# Patient Record
Sex: FEMALE | Race: BLACK OR AFRICAN AMERICAN | NOT HISPANIC OR LATINO | Employment: FULL TIME | URBAN - METROPOLITAN AREA
[De-identification: names, ages, dates, MRNs, and addresses within clinical notes are randomized per-mention and may not be internally consistent; named-entity substitution may affect disease eponyms.]

---

## 2023-09-29 PROBLEM — A59.9 TRICHOMONIASIS: Status: ACTIVE | Noted: 2023-09-29

## 2023-09-29 PROBLEM — Z86.19 HISTORY OF HERPES GENITALIS: Status: ACTIVE | Noted: 2023-09-29

## 2023-09-29 PROBLEM — E66.813 OBESITY, CLASS III, BMI 40-49.9 (MORBID OBESITY): Status: ACTIVE | Noted: 2023-09-29

## 2023-09-29 PROBLEM — F32.A DEPRESSION: Status: ACTIVE | Noted: 2023-09-29

## 2023-09-29 PROBLEM — E66.01 OBESITY, CLASS III, BMI 40-49.9 (MORBID OBESITY) (MULTI): Status: ACTIVE | Noted: 2023-09-29

## 2023-09-29 PROBLEM — A74.9 CHLAMYDIA: Status: ACTIVE | Noted: 2023-09-29

## 2023-09-29 RX ORDER — DOXYCYCLINE HYCLATE 100 MG
100 TABLET ORAL 2 TIMES DAILY
COMMUNITY
Start: 2022-08-08 | End: 2023-10-26 | Stop reason: ALTCHOICE

## 2023-09-29 RX ORDER — ARTIFICIAL TEARS 1; 2; 3 MG/ML; MG/ML; MG/ML
1 SOLUTION/ DROPS OPHTHALMIC 4 TIMES DAILY PRN
COMMUNITY
Start: 2022-04-04 | End: 2023-10-26 | Stop reason: ALTCHOICE

## 2023-09-29 RX ORDER — VALACYCLOVIR HYDROCHLORIDE 1 G/1
1 TABLET, FILM COATED ORAL DAILY
COMMUNITY
Start: 2018-04-12 | End: 2023-10-05 | Stop reason: SDUPTHER

## 2023-09-29 RX ORDER — IBUPROFEN 600 MG/1
600 TABLET ORAL EVERY 8 HOURS PRN
COMMUNITY
Start: 2018-07-03 | End: 2023-10-26 | Stop reason: SDUPTHER

## 2023-10-05 ENCOUNTER — TELEPHONE (OUTPATIENT)
Dept: OBSTETRICS AND GYNECOLOGY | Facility: HOSPITAL | Age: 33
End: 2023-10-05
Payer: COMMERCIAL

## 2023-10-05 DIAGNOSIS — B00.9 HSV (HERPES SIMPLEX VIRUS) INFECTION: Primary | ICD-10-CM

## 2023-10-05 RX ORDER — VALACYCLOVIR HYDROCHLORIDE 1 G/1
1000 TABLET, FILM COATED ORAL DAILY
Qty: 90 TABLET | Refills: 4 | Status: SHIPPED | OUTPATIENT
Start: 2023-10-05 | End: 2023-10-26 | Stop reason: SDUPTHER

## 2023-10-05 NOTE — TELEPHONE ENCOUNTER
Requested Prescriptions     Pending Prescriptions Disp Refills    valACYclovir (Valtrex) 1 gram tablet       Sig: Take 1 tablet (1,000 mg) by mouth once daily.     Patient called in regarding medication refill.  [facility]  Patient stated she has an appt schedule for the end of the month at [facility] but need the medication prior to visit.   Sent the request to the provider for approval.

## 2023-10-26 ENCOUNTER — LAB (OUTPATIENT)
Dept: LAB | Facility: LAB | Age: 33
End: 2023-10-26
Payer: COMMERCIAL

## 2023-10-26 ENCOUNTER — OFFICE VISIT (OUTPATIENT)
Dept: OBSTETRICS AND GYNECOLOGY | Facility: CLINIC | Age: 33
End: 2023-10-26
Payer: COMMERCIAL

## 2023-10-26 VITALS
SYSTOLIC BLOOD PRESSURE: 136 MMHG | WEIGHT: 242.9 LBS | HEART RATE: 88 BPM | DIASTOLIC BLOOD PRESSURE: 83 MMHG | BODY MASS INDEX: 43.03 KG/M2

## 2023-10-26 DIAGNOSIS — Z01.419 VISIT FOR GYNECOLOGIC EXAMINATION: ICD-10-CM

## 2023-10-26 DIAGNOSIS — M54.6 CHRONIC MIDLINE THORACIC BACK PAIN: ICD-10-CM

## 2023-10-26 DIAGNOSIS — Z12.4 CERVICAL CANCER SCREENING: Primary | ICD-10-CM

## 2023-10-26 DIAGNOSIS — Z11.3 SCREEN FOR SEXUALLY TRANSMITTED DISEASES: ICD-10-CM

## 2023-10-26 DIAGNOSIS — B00.9 HSV (HERPES SIMPLEX VIRUS) INFECTION: ICD-10-CM

## 2023-10-26 DIAGNOSIS — Z86.19 HISTORY OF HERPES GENITALIS: ICD-10-CM

## 2023-10-26 DIAGNOSIS — G89.29 CHRONIC MIDLINE THORACIC BACK PAIN: ICD-10-CM

## 2023-10-26 LAB
ERYTHROCYTE [DISTWIDTH] IN BLOOD BY AUTOMATED COUNT: 13.9 % (ref 11.5–14.5)
EST. AVERAGE GLUCOSE BLD GHB EST-MCNC: 105 MG/DL
HBA1C MFR BLD: 5.3 %
HBV SURFACE AG SERPL QL IA: NONREACTIVE
HCT VFR BLD AUTO: 38.3 % (ref 36–46)
HCV AB SER QL: NONREACTIVE
HGB BLD-MCNC: 11.8 G/DL (ref 12–16)
HIV 1+2 AB+HIV1 P24 AG SERPL QL IA: NONREACTIVE
MCH RBC QN AUTO: 25.3 PG (ref 26–34)
MCHC RBC AUTO-ENTMCNC: 30.8 G/DL (ref 32–36)
MCV RBC AUTO: 82 FL (ref 80–100)
NRBC BLD-RTO: 0 /100 WBCS (ref 0–0)
PLATELET # BLD AUTO: 357 X10*3/UL (ref 150–450)
PMV BLD AUTO: 9.4 FL (ref 7.5–11.5)
RBC # BLD AUTO: 4.66 X10*6/UL (ref 4–5.2)
T PALLIDUM AB SER QL: NONREACTIVE
T4 FREE SERPL-MCNC: 0.93 NG/DL (ref 0.78–1.48)
TSH SERPL-ACNC: 0.1 MIU/L (ref 0.44–3.98)
WBC # BLD AUTO: 6.6 X10*3/UL (ref 4.4–11.3)

## 2023-10-26 PROCEDURE — 85027 COMPLETE CBC AUTOMATED: CPT

## 2023-10-26 PROCEDURE — 36415 COLL VENOUS BLD VENIPUNCTURE: CPT

## 2023-10-26 PROCEDURE — 99395 PREV VISIT EST AGE 18-39: CPT | Performed by: ADVANCED PRACTICE MIDWIFE

## 2023-10-26 PROCEDURE — 84443 ASSAY THYROID STIM HORMONE: CPT

## 2023-10-26 PROCEDURE — 87800 DETECT AGNT MULT DNA DIREC: CPT | Performed by: ADVANCED PRACTICE MIDWIFE

## 2023-10-26 PROCEDURE — 87389 HIV-1 AG W/HIV-1&-2 AB AG IA: CPT

## 2023-10-26 PROCEDURE — 88175 CYTOPATH C/V AUTO FLUID REDO: CPT | Mod: TC | Performed by: ADVANCED PRACTICE MIDWIFE

## 2023-10-26 PROCEDURE — 86803 HEPATITIS C AB TEST: CPT

## 2023-10-26 PROCEDURE — 87661 TRICHOMONAS VAGINALIS AMPLIF: CPT | Mod: 59 | Performed by: ADVANCED PRACTICE MIDWIFE

## 2023-10-26 PROCEDURE — 83036 HEMOGLOBIN GLYCOSYLATED A1C: CPT

## 2023-10-26 PROCEDURE — 87624 HPV HI-RISK TYP POOLED RSLT: CPT | Performed by: ADVANCED PRACTICE MIDWIFE

## 2023-10-26 PROCEDURE — 86780 TREPONEMA PALLIDUM: CPT

## 2023-10-26 PROCEDURE — 87340 HEPATITIS B SURFACE AG IA: CPT

## 2023-10-26 PROCEDURE — 84439 ASSAY OF FREE THYROXINE: CPT

## 2023-10-26 RX ORDER — CETIRIZINE HYDROCHLORIDE, PSEUDOEPHEDRINE HYDROCHLORIDE 5; 120 MG/1; MG/1
1 TABLET, FILM COATED, EXTENDED RELEASE ORAL 2 TIMES DAILY
COMMUNITY
Start: 2019-03-13

## 2023-10-26 RX ORDER — BACITRACIN 500 [USP'U]/G
1 OINTMENT TOPICAL 2 TIMES DAILY
COMMUNITY
Start: 2018-09-11

## 2023-10-26 RX ORDER — VALACYCLOVIR HYDROCHLORIDE 1 G/1
1000 TABLET, FILM COATED ORAL DAILY
Qty: 90 TABLET | Refills: 4 | Status: SHIPPED | OUTPATIENT
Start: 2023-10-26

## 2023-10-26 RX ORDER — IBUPROFEN 600 MG/1
600 TABLET ORAL EVERY 8 HOURS PRN
Qty: 90 TABLET | Refills: 2 | Status: SHIPPED | OUTPATIENT
Start: 2023-10-26

## 2023-10-26 ASSESSMENT — ENCOUNTER SYMPTOMS
GASTROINTESTINAL NEGATIVE: 1
CARDIOVASCULAR NEGATIVE: 1
RESPIRATORY NEGATIVE: 1
NEUROLOGICAL NEGATIVE: 1
CONSTITUTIONAL NEGATIVE: 1
PSYCHIATRIC NEGATIVE: 1
EYES NEGATIVE: 1
HEMATOLOGIC/LYMPHATIC NEGATIVE: 1
MUSCULOSKELETAL NEGATIVE: 1
ENDOCRINE NEGATIVE: 1
ALLERGIC/IMMUNOLOGIC NEGATIVE: 1

## 2023-10-26 ASSESSMENT — PAIN SCALES - GENERAL: PAINLEVEL: 0-NO PAIN

## 2023-10-26 NOTE — PROGRESS NOTES
Subjective   Niya Au is a 33 y.o. female who is here for a routine exam. Periods are regular every 28-30 days, lasting 5 days. Dysmenorrhea:moderate, occurring premenstrually. Cyclic symptoms include none. No intermenstrual bleeding, spotting, or discharge.    Current contraception: condoms  History of abnormal Pap smear: no  Family history of uterine or ovarian cancer: no  Regular self breast exam: yes  History of abnormal mammogram: no  Family history of breast cancer: no  History of abnormal lipids: no  Menstrual History:  OB History          5    Para   2    Term   2       0    AB   3    Living   1         SAB   0    IAB   3    Ectopic   0    Multiple        Live Births   1                Patient's last menstrual period was 10/20/2023 (exact date).         Review of Systems   Constitutional: Negative.    HENT: Negative.     Eyes: Negative.    Respiratory: Negative.     Cardiovascular: Negative.    Gastrointestinal: Negative.    Endocrine: Negative.    Genitourinary: Negative.    Musculoskeletal: Negative.    Skin: Negative.    Allergic/Immunologic: Negative.    Neurological: Negative.    Hematological: Negative.    Psychiatric/Behavioral: Negative.         Objective   /83   Pulse 88   Wt 110 kg (242 lb 14.4 oz)   LMP 10/20/2023 (Exact Date)   BMI 43.03 kg/m²     Physical Exam  Vitals reviewed.   Constitutional:       General: She is not in acute distress.     Appearance: Normal appearance.   HENT:      Head: Normocephalic.   Eyes:      Pupils: Pupils are equal, round, and reactive to light.   Cardiovascular:      Rate and Rhythm: Normal rate and regular rhythm.      Heart sounds: No murmur heard.     No gallop.   Pulmonary:      Effort: Pulmonary effort is normal. No respiratory distress.      Breath sounds: Normal breath sounds. No stridor. No wheezing, rhonchi or rales.   Chest:      Chest wall: No tenderness or crepitus.   Breasts:     Right: No inverted nipple, mass, nipple  discharge, skin change or tenderness.      Left: Normal. No inverted nipple, mass, nipple discharge, skin change or tenderness.      Comments: Bilateral scar reduction surgery  Bilateral nipple rings  Abdominal:      General: Abdomen is flat.      Palpations: Abdomen is soft. There is no mass.      Tenderness: There is no abdominal tenderness. There is no right CVA tenderness, left CVA tenderness or rebound.      Hernia: No hernia is present.   Genitourinary:     General: Normal vulva.      Pubic Area: No rash.       Labia:         Right: No rash, tenderness, lesion or injury.         Left: No rash, tenderness, lesion or injury.       Urethra: No prolapse or urethral swelling.      Vagina: Normal. No foreign body. No erythema, tenderness, bleeding, lesions or prolapsed vaginal walls.      Cervix: No cervical motion tenderness, friability or lesion.      Uterus: Normal. Not enlarged, not tender and no uterine prolapse.       Adnexa: Right adnexa normal and left adnexa normal.        Right: No mass or tenderness.          Left: No mass or tenderness.        Rectum: Normal.      Comments: EGBUS WNL   Musculoskeletal:      Cervical back: Neck supple. No rigidity or tenderness.   Skin:     General: Skin is warm and dry.   Neurological:      Mental Status: She is alert.   Psychiatric:         Mood and Affect: Mood normal.         Behavior: Behavior normal.         Thought Content: Thought content normal.         Judgment: Judgment normal.        Assessment/Plan   Problem List Items Addressed This Visit       History of herpes genitalis    Relevant Medications    valACYclovir (Valtrex) 1 gram tablet     Other Visit Diagnoses       Cervical cancer screening    -  Primary    Relevant Orders    THINPREP PAP TEST    Screen for sexually transmitted diseases        Relevant Orders    Hepatitis C Antibody    HIV-1 and HIV-2 antibodies    Syphilis Screen with Reflex    Hepatitis B surface Ag    Visit for gynecologic examination         Relevant Orders    CBC    THINPREP PAP TEST    Hemoglobin A1c    Hepatitis C Antibody    HIV-1 and HIV-2 antibodies    Syphilis Screen with Reflex    TSH with reflex to Free T4 if abnormal    HSV (herpes simplex virus) infection        Relevant Medications    valACYclovir (Valtrex) 1 gram tablet    Chronic midline thoracic back pain        Relevant Medications    ibuprofen (IBU) 600 mg tablet    Other Relevant Orders    Referral to [facility]             All questions answered..  Francesca BETTSM

## 2023-10-28 LAB
C TRACH RRNA SPEC QL NAA+PROBE: NEGATIVE
N GONORRHOEA DNA SPEC QL PROBE+SIG AMP: NEGATIVE
T VAGINALIS RRNA SPEC QL NAA+PROBE: NEGATIVE

## 2023-11-10 LAB
CYTOLOGY CMNT CVX/VAG CYTO-IMP: NORMAL
HPV HR GENOTYPES PNL CVX NAA+PROBE: NEGATIVE
HPV HR GENOTYPES PNL CVX NAA+PROBE: NEGATIVE
HPV16 DNA SPEC QL NAA+PROBE: NEGATIVE
HPV18 DNA SPEC QL NAA+PROBE: NEGATIVE
LAB AP HPV GENOTYPE QUESTION: YES
LAB AP HPV HR: NORMAL
LAB AP PAP ADDITIONAL TESTS: NORMAL
LABORATORY COMMENT REPORT: NORMAL
LMP START DATE: NORMAL
PATH REPORT.TOTAL CANCER: NORMAL

## 2024-11-24 DIAGNOSIS — Z86.19 HISTORY OF HERPES GENITALIS: ICD-10-CM

## 2024-11-24 DIAGNOSIS — B00.9 HSV (HERPES SIMPLEX VIRUS) INFECTION: ICD-10-CM

## 2024-11-25 RX ORDER — VALACYCLOVIR HYDROCHLORIDE 1 G/1
1000 TABLET, FILM COATED ORAL DAILY
Qty: 90 TABLET | Refills: 3 | Status: SHIPPED | OUTPATIENT
Start: 2024-11-25

## 2024-12-09 DIAGNOSIS — M54.6 CHRONIC MIDLINE THORACIC BACK PAIN: ICD-10-CM

## 2024-12-09 DIAGNOSIS — G89.29 CHRONIC MIDLINE THORACIC BACK PAIN: ICD-10-CM

## 2024-12-10 RX ORDER — IBUPROFEN 600 MG/1
600 TABLET ORAL EVERY 8 HOURS PRN
Qty: 90 TABLET | Refills: 1 | Status: SHIPPED | OUTPATIENT
Start: 2024-12-10

## 2025-02-09 ENCOUNTER — CLINICAL SUPPORT (OUTPATIENT)
Dept: EMERGENCY MEDICINE | Facility: HOSPITAL | Age: 35
DRG: 558 | End: 2025-02-09
Payer: COMMERCIAL

## 2025-02-09 ENCOUNTER — HOSPITAL ENCOUNTER (INPATIENT)
Facility: HOSPITAL | Age: 35
LOS: 5 days | Discharge: HOME | DRG: 558 | End: 2025-02-14
Attending: EMERGENCY MEDICINE | Admitting: STUDENT IN AN ORGANIZED HEALTH CARE EDUCATION/TRAINING PROGRAM
Payer: COMMERCIAL

## 2025-02-09 DIAGNOSIS — M62.82 NON-TRAUMATIC RHABDOMYOLYSIS: Primary | ICD-10-CM

## 2025-02-09 DIAGNOSIS — E55.9 VITAMIN D DEFICIENCY: ICD-10-CM

## 2025-02-09 LAB
25(OH)D3 SERPL-MCNC: 8 NG/ML (ref 30–100)
ALBUMIN SERPL BCP-MCNC: 3.3 G/DL (ref 3.4–5)
ALBUMIN SERPL BCP-MCNC: 4.2 G/DL (ref 3.4–5)
ALP SERPL-CCNC: 63 U/L (ref 33–110)
ALT SERPL W P-5'-P-CCNC: 220 U/L (ref 7–45)
AMPHETAMINES UR QL SCN: ABNORMAL
ANION GAP SERPL CALC-SCNC: 16 MMOL/L (ref 10–20)
ANION GAP SERPL CALC-SCNC: 9 MMOL/L (ref 10–20)
APPEARANCE UR: CLEAR
APTT PPP: 28 SECONDS (ref 27–38)
AST SERPL W P-5'-P-CCNC: 552 U/L (ref 9–39)
ATRIAL RATE: 128 BPM
BARBITURATES UR QL SCN: ABNORMAL
BASOPHILS # BLD AUTO: 0.03 X10*3/UL (ref 0–0.1)
BASOPHILS NFR BLD AUTO: 0.3 %
BENZODIAZ UR QL SCN: ABNORMAL
BILIRUB SERPL-MCNC: 0.4 MG/DL (ref 0–1.2)
BILIRUB UR STRIP.AUTO-MCNC: NEGATIVE MG/DL
BUN SERPL-MCNC: 14 MG/DL (ref 6–23)
BUN SERPL-MCNC: 9 MG/DL (ref 6–23)
BZE UR QL SCN: ABNORMAL
CALCIUM SERPL-MCNC: 8.4 MG/DL (ref 8.6–10.6)
CALCIUM SERPL-MCNC: 9.7 MG/DL (ref 8.6–10.6)
CANNABINOIDS UR QL SCN: ABNORMAL
CHLORIDE SERPL-SCNC: 101 MMOL/L (ref 98–107)
CHLORIDE SERPL-SCNC: 98 MMOL/L (ref 98–107)
CK SERPL-CCNC: ABNORMAL U/L (ref 0–215)
CK SERPL-CCNC: NORMAL U/L
CO2 SERPL-SCNC: 26 MMOL/L (ref 21–32)
CO2 SERPL-SCNC: 29 MMOL/L (ref 21–32)
COLOR UR: ABNORMAL
CREAT SERPL-MCNC: 0.43 MG/DL (ref 0.5–1.05)
CREAT SERPL-MCNC: 0.49 MG/DL (ref 0.5–1.05)
D DIMER PPP FEU-MCNC: 895 NG/ML FEU
EGFRCR SERPLBLD CKD-EPI 2021: >90 ML/MIN/1.73M*2
EGFRCR SERPLBLD CKD-EPI 2021: >90 ML/MIN/1.73M*2
EOSINOPHIL # BLD AUTO: 0.06 X10*3/UL (ref 0–0.7)
EOSINOPHIL NFR BLD AUTO: 0.6 %
ERYTHROCYTE [DISTWIDTH] IN BLOOD BY AUTOMATED COUNT: 12.8 % (ref 11.5–14.5)
ETHANOL SERPL-MCNC: <10 MG/DL
ETHANOL SERPL-MCNC: <10 MG/DL
FENTANYL+NORFENTANYL UR QL SCN: ABNORMAL
FIBRINOGEN PPP-MCNC: 452 MG/DL (ref 200–400)
FLUAV RNA RESP QL NAA+PROBE: NOT DETECTED
FLUBV RNA RESP QL NAA+PROBE: NOT DETECTED
GLUCOSE SERPL-MCNC: 129 MG/DL (ref 74–99)
GLUCOSE SERPL-MCNC: 82 MG/DL (ref 74–99)
GLUCOSE UR STRIP.AUTO-MCNC: NORMAL MG/DL
HAV IGM SER QL: NONREACTIVE
HBV CORE IGM SER QL: NONREACTIVE
HBV SURFACE AG SERPL QL IA: NONREACTIVE
HCT VFR BLD AUTO: 39.4 % (ref 36–46)
HCV AB SER QL: NONREACTIVE
HGB BLD-MCNC: 12.8 G/DL (ref 12–16)
HOLD SPECIMEN: NORMAL
IMM GRANULOCYTES # BLD AUTO: 0.12 X10*3/UL (ref 0–0.7)
IMM GRANULOCYTES NFR BLD AUTO: 1.2 % (ref 0–0.9)
INR PPP: 1 (ref 0.9–1.1)
KETONES UR STRIP.AUTO-MCNC: ABNORMAL MG/DL
LEUKOCYTE ESTERASE UR QL STRIP.AUTO: NEGATIVE
LYMPHOCYTES # BLD AUTO: 1.91 X10*3/UL (ref 1.2–4.8)
LYMPHOCYTES NFR BLD AUTO: 18.6 %
MAGNESIUM SERPL-MCNC: 1.94 MG/DL (ref 1.6–2.4)
MAGNESIUM SERPL-MCNC: 2.08 MG/DL (ref 1.6–2.4)
MCH RBC QN AUTO: 26.4 PG (ref 26–34)
MCHC RBC AUTO-ENTMCNC: 32.5 G/DL (ref 32–36)
MCV RBC AUTO: 81 FL (ref 80–100)
METHADONE UR QL SCN: ABNORMAL
MONOCYTES # BLD AUTO: 0.36 X10*3/UL (ref 0.1–1)
MONOCYTES NFR BLD AUTO: 3.5 %
MUCOUS THREADS #/AREA URNS AUTO: NORMAL /LPF
NEUTROPHILS # BLD AUTO: 7.8 X10*3/UL (ref 1.2–7.7)
NEUTROPHILS NFR BLD AUTO: 75.8 %
NITRITE UR QL STRIP.AUTO: NEGATIVE
NRBC BLD-RTO: 0 /100 WBCS (ref 0–0)
OPIATES UR QL SCN: ABNORMAL
OXYCODONE+OXYMORPHONE UR QL SCN: ABNORMAL
P AXIS: 70 DEGREES
P OFFSET: 218 MS
P ONSET: 135 MS
PCP UR QL SCN: ABNORMAL
PH UR STRIP.AUTO: 6 [PH]
PHOSPHATE SERPL-MCNC: 3.7 MG/DL (ref 2.5–4.9)
PHOSPHATE SERPL-MCNC: 4 MG/DL (ref 2.5–4.9)
PLATELET # BLD AUTO: 415 X10*3/UL (ref 150–450)
POTASSIUM SERPL-SCNC: 4.1 MMOL/L (ref 3.5–5.3)
POTASSIUM SERPL-SCNC: 4.1 MMOL/L (ref 3.5–5.3)
PR INTERVAL: 174 MS
PREGNANCY TEST URINE, POC: NEGATIVE
PROT SERPL-MCNC: 8.1 G/DL (ref 6.4–8.2)
PROT UR STRIP.AUTO-MCNC: ABNORMAL MG/DL
PROTHROMBIN TIME: 11.7 SECONDS (ref 9.8–12.8)
Q ONSET: 222 MS
QRS COUNT: 21 BEATS
QRS DURATION: 68 MS
QT INTERVAL: 296 MS
QTC CALCULATION(BAZETT): 432 MS
QTC FREDERICIA: 381 MS
R AXIS: 97 DEGREES
RBC # BLD AUTO: 4.84 X10*6/UL (ref 4–5.2)
RBC # UR STRIP.AUTO: ABNORMAL MG/DL
RBC #/AREA URNS AUTO: NORMAL /HPF
SARS-COV-2 RNA RESP QL NAA+PROBE: NOT DETECTED
SODIUM SERPL-SCNC: 135 MMOL/L (ref 136–145)
SODIUM SERPL-SCNC: 136 MMOL/L (ref 136–145)
SP GR UR STRIP.AUTO: 1.03
SQUAMOUS #/AREA URNS AUTO: NORMAL /HPF
T AXIS: -2 DEGREES
T OFFSET: 370 MS
UROBILINOGEN UR STRIP.AUTO-MCNC: NORMAL MG/DL
VENTRICULAR RATE: 128 BPM
WBC # BLD AUTO: 10.3 X10*3/UL (ref 4.4–11.3)
WBC #/AREA URNS AUTO: NORMAL /HPF

## 2025-02-09 PROCEDURE — 96374 THER/PROPH/DIAG INJ IV PUSH: CPT

## 2025-02-09 PROCEDURE — 82306 VITAMIN D 25 HYDROXY: CPT

## 2025-02-09 PROCEDURE — 99285 EMERGENCY DEPT VISIT HI MDM: CPT | Mod: 25 | Performed by: EMERGENCY MEDICINE

## 2025-02-09 PROCEDURE — 80053 COMPREHEN METABOLIC PANEL: CPT

## 2025-02-09 PROCEDURE — 85384 FIBRINOGEN ACTIVITY: CPT

## 2025-02-09 PROCEDURE — 86038 ANTINUCLEAR ANTIBODIES: CPT | Performed by: INTERNAL MEDICINE

## 2025-02-09 PROCEDURE — 80307 DRUG TEST PRSMV CHEM ANLYZR: CPT

## 2025-02-09 PROCEDURE — 81003 URINALYSIS AUTO W/O SCOPE: CPT

## 2025-02-09 PROCEDURE — 84100 ASSAY OF PHOSPHORUS: CPT

## 2025-02-09 PROCEDURE — 83735 ASSAY OF MAGNESIUM: CPT

## 2025-02-09 PROCEDURE — 85025 COMPLETE CBC W/AUTO DIFF WBC: CPT

## 2025-02-09 PROCEDURE — 2500000004 HC RX 250 GENERAL PHARMACY W/ HCPCS (ALT 636 FOR OP/ED)

## 2025-02-09 PROCEDURE — 82550 ASSAY OF CK (CPK): CPT

## 2025-02-09 PROCEDURE — 84182 PROTEIN WESTERN BLOT TEST: CPT

## 2025-02-09 PROCEDURE — 87636 SARSCOV2 & INF A&B AMP PRB: CPT | Performed by: EMERGENCY MEDICINE

## 2025-02-09 PROCEDURE — 36415 COLL VENOUS BLD VENIPUNCTURE: CPT

## 2025-02-09 PROCEDURE — 99285 EMERGENCY DEPT VISIT HI MDM: CPT | Performed by: EMERGENCY MEDICINE

## 2025-02-09 PROCEDURE — 96361 HYDRATE IV INFUSION ADD-ON: CPT

## 2025-02-09 PROCEDURE — 2500000001 HC RX 250 WO HCPCS SELF ADMINISTERED DRUGS (ALT 637 FOR MEDICARE OP)

## 2025-02-09 PROCEDURE — 86705 HEP B CORE ANTIBODY IGM: CPT

## 2025-02-09 PROCEDURE — 99222 1ST HOSP IP/OBS MODERATE 55: CPT

## 2025-02-09 PROCEDURE — 85379 FIBRIN DEGRADATION QUANT: CPT

## 2025-02-09 PROCEDURE — 86235 NUCLEAR ANTIGEN ANTIBODY: CPT | Performed by: INTERNAL MEDICINE

## 2025-02-09 PROCEDURE — 93005 ELECTROCARDIOGRAM TRACING: CPT

## 2025-02-09 PROCEDURE — 81025 URINE PREGNANCY TEST: CPT | Performed by: EMERGENCY MEDICINE

## 2025-02-09 PROCEDURE — 85610 PROTHROMBIN TIME: CPT

## 2025-02-09 PROCEDURE — 82077 ASSAY SPEC XCP UR&BREATH IA: CPT

## 2025-02-09 PROCEDURE — 93010 ELECTROCARDIOGRAM REPORT: CPT | Performed by: EMERGENCY MEDICINE

## 2025-02-09 PROCEDURE — 1200000002 HC GENERAL ROOM WITH TELEMETRY DAILY

## 2025-02-09 RX ORDER — POLYETHYLENE GLYCOL 3350 17 G/17G
17 POWDER, FOR SOLUTION ORAL DAILY
Status: DISCONTINUED | OUTPATIENT
Start: 2025-02-09 | End: 2025-02-10

## 2025-02-09 RX ORDER — ENOXAPARIN SODIUM 100 MG/ML
40 INJECTION SUBCUTANEOUS EVERY 24 HOURS
Status: DISCONTINUED | OUTPATIENT
Start: 2025-02-09 | End: 2025-02-14 | Stop reason: HOSPADM

## 2025-02-09 RX ORDER — ACETAMINOPHEN 325 MG/1
975 TABLET ORAL 3 TIMES DAILY
Status: DISCONTINUED | OUTPATIENT
Start: 2025-02-09 | End: 2025-02-14 | Stop reason: HOSPADM

## 2025-02-09 RX ORDER — OXYCODONE HYDROCHLORIDE 5 MG/1
5 TABLET ORAL EVERY 4 HOURS PRN
Status: DISCONTINUED | OUTPATIENT
Start: 2025-02-09 | End: 2025-02-13

## 2025-02-09 RX ORDER — OXYCODONE HYDROCHLORIDE 5 MG/1
10 TABLET ORAL EVERY 4 HOURS PRN
Status: DISCONTINUED | OUTPATIENT
Start: 2025-02-09 | End: 2025-02-13

## 2025-02-09 RX ORDER — MORPHINE SULFATE 4 MG/ML
2 INJECTION INTRAVENOUS EVERY 4 HOURS PRN
Status: DISCONTINUED | OUTPATIENT
Start: 2025-02-09 | End: 2025-02-09

## 2025-02-09 RX ORDER — SODIUM CHLORIDE 9 MG/ML
200 INJECTION, SOLUTION INTRAVENOUS CONTINUOUS
Status: ACTIVE | OUTPATIENT
Start: 2025-02-09 | End: 2025-02-10

## 2025-02-09 RX ORDER — SODIUM CHLORIDE, SODIUM LACTATE, POTASSIUM CHLORIDE, CALCIUM CHLORIDE 600; 310; 30; 20 MG/100ML; MG/100ML; MG/100ML; MG/100ML
125 INJECTION, SOLUTION INTRAVENOUS CONTINUOUS
Status: DISCONTINUED | OUTPATIENT
Start: 2025-02-09 | End: 2025-02-09

## 2025-02-09 RX ADMIN — HYDROMORPHONE HYDROCHLORIDE 0.5 MG: 0.5 INJECTION, SOLUTION INTRAMUSCULAR; INTRAVENOUS; SUBCUTANEOUS at 08:45

## 2025-02-09 RX ADMIN — ENOXAPARIN SODIUM 40 MG: 100 INJECTION SUBCUTANEOUS at 21:40

## 2025-02-09 RX ADMIN — OXYCODONE 10 MG: 5 TABLET ORAL at 14:43

## 2025-02-09 RX ADMIN — OXYCODONE 10 MG: 5 TABLET ORAL at 19:05

## 2025-02-09 RX ADMIN — ACETAMINOPHEN 975 MG: 325 TABLET ORAL at 10:54

## 2025-02-09 RX ADMIN — SODIUM CHLORIDE, POTASSIUM CHLORIDE, SODIUM LACTATE AND CALCIUM CHLORIDE 125 ML/HR: 600; 310; 30; 20 INJECTION, SOLUTION INTRAVENOUS at 07:43

## 2025-02-09 RX ADMIN — SODIUM CHLORIDE 200 ML/HR: 9 INJECTION, SOLUTION INTRAVENOUS at 22:23

## 2025-02-09 RX ADMIN — SODIUM CHLORIDE 200 ML/HR: 9 INJECTION, SOLUTION INTRAVENOUS at 10:50

## 2025-02-09 RX ADMIN — HYDROMORPHONE HYDROCHLORIDE 0.2 MG: 1 INJECTION, SOLUTION INTRAMUSCULAR; INTRAVENOUS; SUBCUTANEOUS at 21:40

## 2025-02-09 RX ADMIN — SODIUM CHLORIDE, POTASSIUM CHLORIDE, SODIUM LACTATE AND CALCIUM CHLORIDE 1000 ML: 600; 310; 30; 20 INJECTION, SOLUTION INTRAVENOUS at 05:30

## 2025-02-09 RX ADMIN — ACETAMINOPHEN 975 MG: 325 TABLET ORAL at 14:38

## 2025-02-09 RX ADMIN — ACETAMINOPHEN 975 MG: 325 TABLET ORAL at 21:39

## 2025-02-09 RX ADMIN — SODIUM CHLORIDE, POTASSIUM CHLORIDE, SODIUM LACTATE AND CALCIUM CHLORIDE 1000 ML: 600; 310; 30; 20 INJECTION, SOLUTION INTRAVENOUS at 08:39

## 2025-02-09 RX ADMIN — MORPHINE SULFATE 2 MG: 4 INJECTION INTRAVENOUS at 05:37

## 2025-02-09 SDOH — SOCIAL STABILITY: SOCIAL INSECURITY
WITHIN THE LAST YEAR, HAVE YOU BEEN RAPED OR FORCED TO HAVE ANY KIND OF SEXUAL ACTIVITY BY YOUR PARTNER OR EX-PARTNER?: NO

## 2025-02-09 SDOH — SOCIAL STABILITY: SOCIAL INSECURITY
WITHIN THE LAST YEAR, HAVE YOU BEEN KICKED, HIT, SLAPPED, OR OTHERWISE PHYSICALLY HURT BY YOUR PARTNER OR EX-PARTNER?: NO

## 2025-02-09 SDOH — SOCIAL STABILITY: SOCIAL INSECURITY: DO YOU FEEL UNSAFE GOING BACK TO THE PLACE WHERE YOU ARE LIVING?: NO

## 2025-02-09 SDOH — ECONOMIC STABILITY: FOOD INSECURITY: WITHIN THE PAST 12 MONTHS, YOU WORRIED THAT YOUR FOOD WOULD RUN OUT BEFORE YOU GOT THE MONEY TO BUY MORE.: NEVER TRUE

## 2025-02-09 SDOH — HEALTH STABILITY: PHYSICAL HEALTH: ON AVERAGE, HOW MANY MINUTES DO YOU ENGAGE IN EXERCISE AT THIS LEVEL?: 20 MIN

## 2025-02-09 SDOH — HEALTH STABILITY: PHYSICAL HEALTH
HOW OFTEN DO YOU NEED TO HAVE SOMEONE HELP YOU WHEN YOU READ INSTRUCTIONS, PAMPHLETS, OR OTHER WRITTEN MATERIAL FROM YOUR DOCTOR OR PHARMACY?: NEVER

## 2025-02-09 SDOH — ECONOMIC STABILITY: FOOD INSECURITY: WITHIN THE PAST 12 MONTHS, THE FOOD YOU BOUGHT JUST DIDN'T LAST AND YOU DIDN'T HAVE MONEY TO GET MORE.: NEVER TRUE

## 2025-02-09 SDOH — SOCIAL STABILITY: SOCIAL INSECURITY: WITHIN THE LAST YEAR, HAVE YOU BEEN HUMILIATED OR EMOTIONALLY ABUSED IN OTHER WAYS BY YOUR PARTNER OR EX-PARTNER?: NO

## 2025-02-09 SDOH — SOCIAL STABILITY: SOCIAL INSECURITY: DO YOU FEEL ANYONE HAS EXPLOITED OR TAKEN ADVANTAGE OF YOU FINANCIALLY OR OF YOUR PERSONAL PROPERTY?: NO

## 2025-02-09 SDOH — ECONOMIC STABILITY: TRANSPORTATION INSECURITY: IN THE PAST 12 MONTHS, HAS LACK OF TRANSPORTATION KEPT YOU FROM MEDICAL APPOINTMENTS OR FROM GETTING MEDICATIONS?: NO

## 2025-02-09 SDOH — SOCIAL STABILITY: SOCIAL INSECURITY: DOES ANYONE TRY TO KEEP YOU FROM HAVING/CONTACTING OTHER FRIENDS OR DOING THINGS OUTSIDE YOUR HOME?: NO

## 2025-02-09 SDOH — ECONOMIC STABILITY: INCOME INSECURITY: IN THE PAST 12 MONTHS HAS THE ELECTRIC, GAS, OIL, OR WATER COMPANY THREATENED TO SHUT OFF SERVICES IN YOUR HOME?: NO

## 2025-02-09 SDOH — HEALTH STABILITY: PHYSICAL HEALTH: ON AVERAGE, HOW MANY DAYS PER WEEK DO YOU ENGAGE IN MODERATE TO STRENUOUS EXERCISE (LIKE A BRISK WALK)?: 3 DAYS

## 2025-02-09 SDOH — SOCIAL STABILITY: SOCIAL INSECURITY: WITHIN THE LAST YEAR, HAVE YOU BEEN AFRAID OF YOUR PARTNER OR EX-PARTNER?: NO

## 2025-02-09 SDOH — SOCIAL STABILITY: SOCIAL INSECURITY: WERE YOU ABLE TO COMPLETE ALL THE BEHAVIORAL HEALTH SCREENINGS?: YES

## 2025-02-09 SDOH — SOCIAL STABILITY: SOCIAL INSECURITY: ARE YOU OR HAVE YOU BEEN THREATENED OR ABUSED PHYSICALLY, EMOTIONALLY, OR SEXUALLY BY ANYONE?: NO

## 2025-02-09 SDOH — SOCIAL STABILITY: SOCIAL INSECURITY: HAVE YOU HAD ANY THOUGHTS OF HARMING ANYONE ELSE?: NO

## 2025-02-09 SDOH — SOCIAL STABILITY: SOCIAL INSECURITY: HAVE YOU HAD THOUGHTS OF HARMING ANYONE ELSE?: NO

## 2025-02-09 SDOH — SOCIAL STABILITY: SOCIAL INSECURITY: ARE THERE ANY APPARENT SIGNS OF INJURIES/BEHAVIORS THAT COULD BE RELATED TO ABUSE/NEGLECT?: NO

## 2025-02-09 SDOH — SOCIAL STABILITY: SOCIAL INSECURITY: ABUSE: ADULT

## 2025-02-09 SDOH — SOCIAL STABILITY: SOCIAL INSECURITY: HAS ANYONE EVER THREATENED TO HURT YOUR FAMILY OR YOUR PETS?: NO

## 2025-02-09 ASSESSMENT — PATIENT HEALTH QUESTIONNAIRE - PHQ9
2. FEELING DOWN, DEPRESSED OR HOPELESS: NOT AT ALL
SUM OF ALL RESPONSES TO PHQ9 QUESTIONS 1 & 2: 0
1. LITTLE INTEREST OR PLEASURE IN DOING THINGS: NOT AT ALL

## 2025-02-09 ASSESSMENT — PAIN DESCRIPTION - LOCATION
LOCATION: GENERALIZED

## 2025-02-09 ASSESSMENT — ACTIVITIES OF DAILY LIVING (ADL)
TOILETING: INDEPENDENT
GROOMING: NEEDS ASSISTANCE
DRESSING YOURSELF: NEEDS ASSISTANCE
ADEQUATE_TO_COMPLETE_ADL: NO
HEARING - RIGHT EAR: FUNCTIONAL
LACK_OF_TRANSPORTATION: NO
PATIENT'S MEMORY ADEQUATE TO SAFELY COMPLETE DAILY ACTIVITIES?: YES
JUDGMENT_ADEQUATE_SAFELY_COMPLETE_DAILY_ACTIVITIES: YES
HEARING - LEFT EAR: FUNCTIONAL
WALKS IN HOME: INDEPENDENT
BATHING: INDEPENDENT
FEEDING YOURSELF: INDEPENDENT

## 2025-02-09 ASSESSMENT — COGNITIVE AND FUNCTIONAL STATUS - GENERAL
CLIMB 3 TO 5 STEPS WITH RAILING: A LITTLE
PATIENT BASELINE BEDBOUND: NO
DAILY ACTIVITIY SCORE: 22
DRESSING REGULAR LOWER BODY CLOTHING: A LITTLE
DRESSING REGULAR UPPER BODY CLOTHING: A LITTLE
MOVING FROM LYING ON BACK TO SITTING ON SIDE OF FLAT BED WITH BEDRAILS: A LITTLE
WALKING IN HOSPITAL ROOM: A LITTLE
MOBILITY SCORE: 20
TURNING FROM BACK TO SIDE WHILE IN FLAT BAD: A LITTLE

## 2025-02-09 ASSESSMENT — LIFESTYLE VARIABLES
AUDIT TOTAL SCORE: 0
HOW OFTEN DURING THE LAST YEAR HAVE YOU NEEDED AN ALCOHOLIC DRINK FIRST THING IN THE MORNING TO GET YOURSELF GOING AFTER A NIGHT OF HEAVY DRINKING: NEVER
PRESCIPTION_ABUSE_PAST_12_MONTHS: NO
HOW OFTEN DURING THE LAST YEAR HAVE YOU BEEN UNABLE TO REMEMBER WHAT HAPPENED THE NIGHT BEFORE BECAUSE YOU HAD BEEN DRINKING: NEVER
HOW OFTEN DO YOU HAVE A DRINK CONTAINING ALCOHOL: 2-4 TIMES A MONTH
HAVE YOU EVER FELT YOU SHOULD CUT DOWN ON YOUR DRINKING: NO
HOW OFTEN DURING THE LAST YEAR HAVE YOU FAILED TO DO WHAT WAS NORMALLY EXPECTED FROM YOU BECAUSE OF DRINKING: NEVER
HOW OFTEN DO YOU HAVE 6 OR MORE DRINKS ON ONE OCCASION: MONTHLY
AUDIT TOTAL SCORE: -1
EVER FELT BAD OR GUILTY ABOUT YOUR DRINKING: NO
SUBSTANCE_ABUSE_PAST_12_MONTHS: NO
EVER HAD A DRINK FIRST THING IN THE MORNING TO STEADY YOUR NERVES TO GET RID OF A HANGOVER: NO
HAS A RELATIVE, FRIEND, DOCTOR, OR ANOTHER HEALTH PROFESSIONAL EXPRESSED CONCERN ABOUT YOUR DRINKING OR SUGGESTED YOU CUT DOWN: NO
SKIP TO QUESTIONS 9-10: 0
HOW MANY STANDARD DRINKS CONTAINING ALCOHOL DO YOU HAVE ON A TYPICAL DAY: 1 OR 2
AUDIT-C TOTAL SCORE: 4
AUDIT-C TOTAL SCORE: 4
HAVE YOU OR SOMEONE ELSE BEEN INJURED AS A RESULT OF YOUR DRINKING: NO
TOTAL SCORE: 0
HAVE PEOPLE ANNOYED YOU BY CRITICIZING YOUR DRINKING: NO
HOW OFTEN DURING THE LAST YEAR HAVE YOU FOUND THAT YOU WERE NOT ABLE TO STOP DRINKING ONCE YOU HAD STARTED: NEVER

## 2025-02-09 ASSESSMENT — PAIN SCALES - GENERAL
PAINLEVEL_OUTOF10: 10 - WORST POSSIBLE PAIN
PAINLEVEL_OUTOF10: 7
PAINLEVEL_OUTOF10: 0 - NO PAIN
PAINLEVEL_OUTOF10: 10 - WORST POSSIBLE PAIN
PAINLEVEL_OUTOF10: 0 - NO PAIN

## 2025-02-09 ASSESSMENT — PAIN - FUNCTIONAL ASSESSMENT
PAIN_FUNCTIONAL_ASSESSMENT: 0-10

## 2025-02-09 ASSESSMENT — COLUMBIA-SUICIDE SEVERITY RATING SCALE - C-SSRS
6. HAVE YOU EVER DONE ANYTHING, STARTED TO DO ANYTHING, OR PREPARED TO DO ANYTHING TO END YOUR LIFE?: NO
2. HAVE YOU ACTUALLY HAD ANY THOUGHTS OF KILLING YOURSELF?: NO
1. IN THE PAST MONTH, HAVE YOU WISHED YOU WERE DEAD OR WISHED YOU COULD GO TO SLEEP AND NOT WAKE UP?: NO

## 2025-02-09 ASSESSMENT — PAIN DESCRIPTION - PAIN TYPE
TYPE: CHRONIC PAIN
TYPE: ACUTE PAIN

## 2025-02-09 ASSESSMENT — PAIN DESCRIPTION - FREQUENCY: FREQUENCY: CONSTANT/CONTINUOUS

## 2025-02-09 ASSESSMENT — PAIN DESCRIPTION - PROGRESSION: CLINICAL_PROGRESSION: GRADUALLY IMPROVING
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
## 2025-02-09 NOTE — LETTER
February 14, 2025     Patient: Niya Au   YOB: 1990   Date of Visit: 2/9/2025       To Whom It May Concern:    To Whom It May Concern:    Niya Au was admitted at [facility] on 2/9/2025 until feb 14th . Please excuse Niya for her absence from work until 2/18/25. No heavy weight lifting until further diagnosis, pending appointment with Genetics.     If you have any questions or concerns, please don't hesitate to call.         Sincerely,       Ren Banuelos MD      CC: No Recipients

## 2025-02-09 NOTE — LETTER
February 14, 2025     Patient: Niya Au   YOB: 1990   Date of Visit: 2/9/2025       To Whom It May Concern:    Niya Au was admitted at [facility] on 2/9/2025 until feb 14th . Please excuse Niya for her absence from work until 2/16/25. No heavy weight lifting until further diagnosis, pending appointment with Genetics.     If you have any questions or concerns, please don't hesitate to call.         Sincerely,       Ren Banuelos MD      CC: No Recipients

## 2025-02-09 NOTE — PROGRESS NOTES
Emergency Department Transition of Care Note       Signout   I received Niya Au in signout from Dr. Slater.  Please see the ED Provider Note for all HPI, PE and MDM up to the time of signout at 0700.  This is in addition to the primary record.    In brief Niya Au is an 34 y.o. female with PMH for recurrent rhabdomyolysis. Patient presents for concern for rhabdomyolysis.     At the time of signout we were awaiting:  CK results.     ED Course & Medical Decision Making   Medical Decision Making:  Under my care,   CK results were above 100,000. Patient given an additional 1L LR. Admitted to the hospital for continued treatment and workup of her rhabdo.   Patient informed me that last time she had rhabdomyolysis she was positive for the flu.  Patient is negative for the flu at this time but does note that she recently had viral-like symptoms with a cough and rhinorrhea approximately 1 week ago.    ED Course:  ED Course as of 02/09/25 0841   Sun Feb 09, 2025   0450 ECG 12 lead  Sinus tachycardia unspecific T wave abnormalities. No ischemia present, compared to EKG from [facility] 10 2020 [AA]   0828 Creatine Kinase: 108,280 [RS]      ED Course User Index  [AA] Javi Slater, DPM  [RS] Elvin Vences DO         Diagnoses as of 02/09/25 0841   Non-traumatic rhabdomyolysis       Disposition   As a result of their workup, the patient will require admission to the hospital.  The patient was informed of her diagnosis.  The patient was given the opportunity to ask questions and I answered them. The patient agreed to be admitted to the hospital.    Procedures   Procedures    Patient seen and discussed with ED attending physician.    Elvin Vences DO  Emergency Medicine

## 2025-02-09 NOTE — ED PROVIDER NOTES
Emergency Department Provider Note        History of Present Illness     History provided by: Patient  Limitations to History: None  External Records Reviewed with Brief Summary: None    HPI:  Niya Au is a 34 y.o. female with PMH for recurrent rhabdomyolysis. Patient presents for concern for rhabdomyolysis. Patient states that symptoms started last Friday, patient states that she has been having muscle soreness and body aches. Patient noted that today her urine was a dark brown color. Patient states that in the past when she is having a flare she has similar symptoms. Patient denies current trauma, strenuous exercise or current illness. Patient states that she has tried taking muscle relaxer and tylenol for the pain but did not have any relief. Patient states that she is also having some chest pain. When asked about chest pain patient states that she has had a cough since last Monday. Patient was unsure if she had the flu or what she had but she contributes the chest pain to her coughing. Patient denies SOB, nausea, fever, chills, diarrhea, pain with urination or other consitutional symptoms.     Physical Exam   Triage vitals:  T 37 °C (98.6 °F)  HR (!) 133  /84  RR 18  O2 98 %      General: Awake, alert, in no acute distress  Eyes: Gaze conjugate.  No scleral icterus or injection  HENT: Normo-cephalic, atraumatic. No stridor  CV: Tachycardic rate, regular rhythm. Radial pulses 2+ bilaterally  Resp: Breathing non-labored, speaking in full sentences.  Clear to auscultation bilaterally  GI: Soft, non-distended, non-tender. No rebound or guarding.  MSK/Extremities: No gross bony deformities. Moving all extremities  Skin: Warm. Appropriate color  Neuro: Alert. Oriented. Face symmetric. Speech is fluent.  Gross strength and sensation intact in b/l UE and LEs  Psych: Appropriate mood and affect    Medical Decision Making & ED Course   Medical Decision Makin y.o. female PMH for recurrent  rhabdomyolysis. Patient presents for concern for rhabdomyolysis. Patient states that symptoms started last Friday, patient states that she has been having muscle soreness and body aches. Patient noted that today her urine was a dark brown color. Patient states that in the past when she is having a flare she has similar symptoms. Patient denies current trauma, strenuous exercise or current illness. Will order Creatine kinase, UA, CBC, CMP, Phosphorus and Magnesium labs for patient. Patient will likely need admission pending lab results.    ----      Differential diagnoses considered include but are not limited to: rhabdomyolysis, Viral infection     Social Determinants of Health which Significantly Impact Care: None identified     EKG Independent Interpretation: EKG interpreted by myself. Please see ED Course for full interpretation.    Independent Result Review and Interpretation: Relevant laboratory and radiographic results were reviewed and independently interpreted by myself.  As necessary, they are commented on in the ED Course.    Chronic conditions affecting the patient's care: As documented above in [facility]    The patient was discussed with the following consultants/services: None    Care Considerations: As documented above in [facility]    ED Course:  ED Course as of 02/09/25 0605   Sun Feb 09, 2025   0450 ECG 12 lead  Sinus tachycardia unspecific T wave abnormalities. No ischemia present, compared to EKG from [facility] 10 2020 [AA]      ED Course User Index  [AA] Javi Slater DPM     Disposition   Patient was signed out to Vagnie at 0700 pending completion of their work-up.  Please see the next provider's transition of care note for the remainder of the patient's care.     Procedures   Procedures        Javi Slater DPM PGY-1  Emergency Medicine     Javi Slater DPM  Resident  02/09/25 [id number]

## 2025-02-10 LAB
ALBUMIN SERPL BCP-MCNC: 3.2 G/DL (ref 3.4–5)
ALP SERPL-CCNC: 46 U/L (ref 33–110)
ALT SERPL W P-5'-P-CCNC: 226 U/L (ref 7–45)
ANION GAP SERPL CALC-SCNC: 9 MMOL/L (ref 10–20)
AST SERPL W P-5'-P-CCNC: 544 U/L (ref 9–39)
BILIRUB DIRECT SERPL-MCNC: 0.1 MG/DL (ref 0–0.3)
BILIRUB SERPL-MCNC: 0.4 MG/DL (ref 0–1.2)
BUN SERPL-MCNC: 8 MG/DL (ref 6–23)
CALCIUM SERPL-MCNC: 8.7 MG/DL (ref 8.6–10.6)
CHLORIDE SERPL-SCNC: 100 MMOL/L (ref 98–107)
CK SERPL-CCNC: ABNORMAL U/L (ref 0–215)
CO2 SERPL-SCNC: 27 MMOL/L (ref 21–32)
CREAT SERPL-MCNC: 0.39 MG/DL (ref 0.5–1.05)
EGFRCR SERPLBLD CKD-EPI 2021: >90 ML/MIN/1.73M*2
GLUCOSE SERPL-MCNC: 79 MG/DL (ref 74–99)
MAGNESIUM SERPL-MCNC: 1.88 MG/DL (ref 1.6–2.4)
PHOSPHATE SERPL-MCNC: 3.3 MG/DL (ref 2.5–4.9)
POTASSIUM SERPL-SCNC: 4.4 MMOL/L (ref 3.5–5.3)
PROT SERPL-MCNC: 6.1 G/DL (ref 6.4–8.2)
SODIUM SERPL-SCNC: 132 MMOL/L (ref 136–145)

## 2025-02-10 PROCEDURE — 83918 ORGANIC ACIDS TOTAL QUANT: CPT | Performed by: STUDENT IN AN ORGANIZED HEALTH CARE EDUCATION/TRAINING PROGRAM

## 2025-02-10 PROCEDURE — 82139 AMINO ACIDS QUAN 6 OR MORE: CPT | Performed by: STUDENT IN AN ORGANIZED HEALTH CARE EDUCATION/TRAINING PROGRAM

## 2025-02-10 PROCEDURE — 84100 ASSAY OF PHOSPHORUS: CPT

## 2025-02-10 PROCEDURE — 2500000001 HC RX 250 WO HCPCS SELF ADMINISTERED DRUGS (ALT 637 FOR MEDICARE OP): Performed by: STUDENT IN AN ORGANIZED HEALTH CARE EDUCATION/TRAINING PROGRAM

## 2025-02-10 PROCEDURE — 82550 ASSAY OF CK (CPK): CPT

## 2025-02-10 PROCEDURE — 99232 SBSQ HOSP IP/OBS MODERATE 35: CPT | Performed by: STUDENT IN AN ORGANIZED HEALTH CARE EDUCATION/TRAINING PROGRAM

## 2025-02-10 PROCEDURE — 83735 ASSAY OF MAGNESIUM: CPT

## 2025-02-10 PROCEDURE — 80048 BASIC METABOLIC PNL TOTAL CA: CPT

## 2025-02-10 PROCEDURE — 2500000004 HC RX 250 GENERAL PHARMACY W/ HCPCS (ALT 636 FOR OP/ED): Performed by: STUDENT IN AN ORGANIZED HEALTH CARE EDUCATION/TRAINING PROGRAM

## 2025-02-10 PROCEDURE — 1200000002 HC GENERAL ROOM WITH TELEMETRY DAILY

## 2025-02-10 PROCEDURE — 36415 COLL VENOUS BLD VENIPUNCTURE: CPT | Performed by: STUDENT IN AN ORGANIZED HEALTH CARE EDUCATION/TRAINING PROGRAM

## 2025-02-10 PROCEDURE — 2500000001 HC RX 250 WO HCPCS SELF ADMINISTERED DRUGS (ALT 637 FOR MEDICARE OP)

## 2025-02-10 PROCEDURE — 82248 BILIRUBIN DIRECT: CPT

## 2025-02-10 PROCEDURE — 2500000004 HC RX 250 GENERAL PHARMACY W/ HCPCS (ALT 636 FOR OP/ED)

## 2025-02-10 PROCEDURE — 36415 COLL VENOUS BLD VENIPUNCTURE: CPT

## 2025-02-10 RX ORDER — AMOXICILLIN 250 MG
2 CAPSULE ORAL 2 TIMES DAILY
Status: DISCONTINUED | OUTPATIENT
Start: 2025-02-10 | End: 2025-02-14 | Stop reason: HOSPADM

## 2025-02-10 RX ORDER — SODIUM CHLORIDE 9 MG/ML
200 INJECTION, SOLUTION INTRAVENOUS CONTINUOUS
Status: DISCONTINUED | OUTPATIENT
Start: 2025-02-10 | End: 2025-02-11

## 2025-02-10 RX ORDER — POLYETHYLENE GLYCOL 3350 17 G/17G
17 POWDER, FOR SOLUTION ORAL 2 TIMES DAILY
Status: DISCONTINUED | OUTPATIENT
Start: 2025-02-10 | End: 2025-02-14 | Stop reason: HOSPADM

## 2025-02-10 RX ADMIN — SENNOSIDES AND DOCUSATE SODIUM 2 TABLET: 50; 8.6 TABLET ORAL at 21:18

## 2025-02-10 RX ADMIN — ENOXAPARIN SODIUM 40 MG: 100 INJECTION SUBCUTANEOUS at 20:36

## 2025-02-10 RX ADMIN — SODIUM CHLORIDE 200 ML/HR: 9 INJECTION, SOLUTION INTRAVENOUS at 15:07

## 2025-02-10 RX ADMIN — OXYCODONE 10 MG: 5 TABLET ORAL at 07:56

## 2025-02-10 RX ADMIN — OXYCODONE 10 MG: 5 TABLET ORAL at 15:08

## 2025-02-10 RX ADMIN — ACETAMINOPHEN 975 MG: 325 TABLET ORAL at 20:36

## 2025-02-10 RX ADMIN — HYDROMORPHONE HYDROCHLORIDE 0.2 MG: 1 INJECTION, SOLUTION INTRAMUSCULAR; INTRAVENOUS; SUBCUTANEOUS at 22:41

## 2025-02-10 RX ADMIN — HYDROMORPHONE HYDROCHLORIDE 0.2 MG: 1 INJECTION, SOLUTION INTRAMUSCULAR; INTRAVENOUS; SUBCUTANEOUS at 09:51

## 2025-02-10 RX ADMIN — OXYCODONE 10 MG: 5 TABLET ORAL at 02:51

## 2025-02-10 RX ADMIN — SODIUM CHLORIDE 200 ML/HR: 9 INJECTION, SOLUTION INTRAVENOUS at 03:15

## 2025-02-10 RX ADMIN — HYDROMORPHONE HYDROCHLORIDE 0.2 MG: 1 INJECTION, SOLUTION INTRAMUSCULAR; INTRAVENOUS; SUBCUTANEOUS at 18:01

## 2025-02-10 RX ADMIN — OXYCODONE 10 MG: 5 TABLET ORAL at 20:36

## 2025-02-10 RX ADMIN — ACETAMINOPHEN 975 MG: 325 TABLET ORAL at 08:00

## 2025-02-10 RX ADMIN — SODIUM CHLORIDE 200 ML/HR: 9 INJECTION, SOLUTION INTRAVENOUS at 09:31

## 2025-02-10 RX ADMIN — SODIUM CHLORIDE 200 ML/HR: 9 INJECTION, SOLUTION INTRAVENOUS at 20:37

## 2025-02-10 RX ADMIN — ACETAMINOPHEN 975 MG: 325 TABLET ORAL at 15:08

## 2025-02-10 RX ADMIN — POLYETHYLENE GLYCOL 3350 17 G: 17 POWDER, FOR SOLUTION ORAL at 08:00

## 2025-02-10 ASSESSMENT — PAIN - FUNCTIONAL ASSESSMENT
PAIN_FUNCTIONAL_ASSESSMENT: 0-10

## 2025-02-10 ASSESSMENT — COGNITIVE AND FUNCTIONAL STATUS - GENERAL
DRESSING REGULAR UPPER BODY CLOTHING: A LITTLE
DRESSING REGULAR LOWER BODY CLOTHING: A LITTLE
MOBILITY SCORE: 21
WALKING IN HOSPITAL ROOM: A LITTLE
CLIMB 3 TO 5 STEPS WITH RAILING: A LITTLE
DAILY ACTIVITIY SCORE: 22
TURNING FROM BACK TO SIDE WHILE IN FLAT BAD: A LITTLE

## 2025-02-10 ASSESSMENT — PAIN SCALES - GENERAL
PAINLEVEL_OUTOF10: 10 - WORST POSSIBLE PAIN
PAINLEVEL_OUTOF10: 0 - NO PAIN
PAINLEVEL_OUTOF10: 0 - NO PAIN
PAINLEVEL_OUTOF10: 10 - WORST POSSIBLE PAIN
PAINLEVEL_OUTOF10: 0 - NO PAIN
PAINLEVEL_OUTOF10: 9
PAINLEVEL_OUTOF10: 10 - WORST POSSIBLE PAIN
PAINLEVEL_OUTOF10: 10 - WORST POSSIBLE PAIN

## 2025-02-10 ASSESSMENT — PAIN DESCRIPTION - LOCATION
LOCATION: ABDOMEN
LOCATION: GENERALIZED
LOCATION: ABDOMEN

## 2025-02-10 ASSESSMENT — PAIN SCALES - WONG BAKER
WONGBAKER_NUMERICALRESPONSE: HURTS WHOLE LOT
WONGBAKER_NUMERICALRESPONSE: HURTS WHOLE LOT

## 2025-02-10 NOTE — PROGRESS NOTES
"   02/10/25 1302   Discharge Planning   Expected Discharge Disposition Home     Attempted to meet with pt to complete discharge planning assessment but pt declined assessment at this time due to being \"tired.\" Pt denies having any home going needs for discharge planning and stated she will call for a ride home at time of discharge. Care coordinator will continue to follow for discharge planning needs.    Alise Chapman RN  Transitional Care Coordinator (TCC)  [phone] or [phone]  "

## 2025-02-10 NOTE — CARE PLAN
Problem: Fall/Injury  Goal: Not fall by end of shift  Outcome: Progressing     Problem: Fall/Injury  Goal: Be free from injury by end of the shift  Outcome: Progressing     Problem: Fall/Injury  Goal: Verbalize understanding of personal risk factors for fall in the hospital  Outcome: Progressing     Problem: Pain  Goal: Takes deep breaths with improved pain control throughout the shift  Outcome: Progressing     Problem: Pain  Goal: Free from opioid side effects throughout the shift  Outcome: Progressing   The patient's goals for the shift include      The clinical goals for the shift include patient goal will be for pain level to be managed at 5 through out shift. patient will also remain safe throughout shift.    Over the shift, the patient did not make progress towards all of  the following goals throughout shift.

## 2025-02-10 NOTE — NURSING NOTE
Admission Note  New admit from the ED. Patient complained of generalized pain upon arrival to the unit and medicated per PRN order.  Pt is AXOX4 , and oriented to room, call light, TV and bed controls. Patient belongings include a cell phone,, , and jacket  by her bedside. Anticipated discharge disposition would be to pt's home.    Ariadna Moore RN

## 2025-02-10 NOTE — CARE PLAN
The patient's goals for the shift include will be safe and free from falls or injury during the shift    The clinical goals for the shift include will verbalize pain<5 by end of shift    Over the shift, the patient did not make progress toward the following goals. Barriers to progression include pain, generalized weakness. Recommendations to address these barriers include pain management; provide optimal environment for ambulation..

## 2025-02-10 NOTE — PROGRESS NOTES
"Pharmacy Medication History Review    Niya Au is a 34 y.o. female admitted for Non-traumatic rhabdomyolysis. Pharmacy reviewed the patient's [id number] medications and allergies for accuracy.    Medications ADDED:  None  Medications CHANGED:  None  Medications REMOVED:   Bacitracin ointment   Zyrtec D 12-hour    The list below reflects the updated PTA list.   Prior to Admission Medications   Prescriptions Informant   ibuprofen 600 mg tablet Self   Sig: TAKE 1 TABLET BY MOUTH EVERY 8 HOURS AS NEEDED FOR PAIN   Patient taking differently: Take 1 tablet (600 mg) by mouth every 8 hours if needed (pain).   valACYclovir (Valtrex) 1 gram tablet Self   Sig: TAKE 1 TABLET BY MOUTH EVERY DAY      Facility-Administered Medications: None        The list below reflects the updated allergy list. Please review each documented allergy for additional clarification and justification.  Allergies  Reviewed by Ramon Arredondo PharmD on 2/10/2025        Severity Reactions Comments    Amoxicillin Not Specified Myalgia     Penicillins Not Specified Myalgia             Patient accepts M2B at discharge.     Sources:   [facility]  Pharmacy dispense history  Patient interview Moderate historian  Chart Review  Care Everywhere    Additional Comments:  No recent dispense history per [facility]  Patient was able to verify PTA med list with prompting      Ramon Arredondo PharmD  Transitions of Care Pharmacist  02/10/25     Secure Chat preferred   If no response call [phone] or [employer] \"Med Rec\"    "

## 2025-02-10 NOTE — PROGRESS NOTES
"Niya Au is a 34 y.o. female on hospital day 1 of admission presenting with Non-traumatic rhabdomyolysis.    Subjective     Patient still complains of muscle aches. CK remains elevated. Will cont. IVF hydration.    Objective     GENERAL APPEARANCE: A&Ox3, appears in no acute distress  HEAD: normocephalic, atraumatic  THROAT: Oral cavity and pharynx normal. No inflammation, swelling, exudate, or lesions.  NECK: Neck supple, non-tender without lymphadenopathy, masses or thyromegaly.  CARDIAC: Normal S1 and S2. No S3, S4 or murmurs. Rhythm is regular. There is no peripheral edema, cyanosis or pallor. Extremities are warm and well perfused. No carotid bruits.  LUNGS: Clear to auscultation bilaterally without rales, rhonchi, wheezing or diminished breath sounds.  ABDOMEN: Positive bowel sounds. Soft, nondistended, nontender. No guarding or rebound. No masses.  EXTREMITIES: No significant deformity or joint abnormality. No edema. Peripheral pulses intact. No varicosities.  SKIN: Skin normal color, texture and turgor with no lesions or rash  PSYCHIATRIC: oriented to person, place, and time, good judgement and reason, without hallucinations, abnormal affect or abnormal behaviors during the examination. Patient is not suicidal.        Last Recorded Vitals  Blood pressure 118/73, pulse 97, temperature 36.7 °C (98.1 °F), resp. rate 18, height 1.6 m (5' 3\"), weight 102 kg (224 lb), SpO2 96%.  Intake/Output last 3 Shifts:  No intake/output data recorded.    Relevant Results  Lab Results   Component Value Date    WBC 10.3 02/09/2025    HGB 12.8 02/09/2025    HCT 39.4 02/09/2025    MCV 81 02/09/2025     02/09/2025      Lab Results   Component Value Date    GLUCOSE 79 02/10/2025    CALCIUM 8.7 02/10/2025     (L) 02/10/2025    K 4.4 02/10/2025    CO2 27 02/10/2025     02/10/2025    BUN 8 02/10/2025    CREATININE 0.39 (L) 02/10/2025     Scheduled medications  acetaminophen, 975 mg, oral, TID  enoxaparin, 40 " "mg, subcutaneous, q24h  polyethylene glycol, 17 g, oral, Daily      Continuous medications  sodium chloride 0.9%, 200 mL/hr, Last Rate: 200 mL/hr (02/10/25 7307)      PRN medications  PRN medications: HYDROmorphone, oxyCODONE, oxyCODONE    Assessment/Plan     Ms. Au is a 35yo F with PMH recurrent rhabdomyolysis who presents with muscle soreness and body aches since last Friday with CK >100K c/w rhabdomyolysis. Pt notes that she has had viral URI sx last week with cough, pleuritic CP and rhinorrhea. COVID/flu/RSV negative. Per chart review, patient has had an extensive metabolic workup in the past as well as muscle bx that was unrevealing. 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      Rhabdomyolysis in the setting of recent viral URI  Elevated transaminases  Recurrent rhabdo   - TSH 0.10, free T4 0.93   - s/p 2L LR in the ED. C/w NS at 200cc/hr until CK <5K  - Strict I/Os, goal -300cc/h, diuresis PRN for s/s V overload   - monitor CK, RFP q12h (monitor for hyperkalemia/hypocalcemia)   - VBG, defer NaHCO3 at this time   - myositis panel, UDS, EtOH level ordered.  - hepatitis panel   - pain regimen: tylenol 975mg TID, oxy 5mg q4h PRN, oxy 10mg q4h PRN, IV dilaudid 0.2mg q3h PRN for breakthrough   - bowel reg   - Will cont. IVF @200cc/hr and repeat CK in the am  - Spoke with genetics, recommended metabolism labs and will follow-up with as an outpatient (their office will call the patient)         F: Cont. NS 200cc/h  E: PRN, RFP q12h   N: Regular  A: PIV  DVT ppx: lovenox  Code Status: Full Code  NOK: júnior Fortune [phone]     Oneal Reddy MD     The patient encounter includes all but not limited to; Evaluation of laboratory results, pertinent imaging, and vital signs. Daily updates are discussed with any consulting services and family/medical power of  as needed. The " patient's discharge  process begins at admission and daily contact with the patient's TCC and SW is pertinent in their efficient and safe discharge.

## 2025-02-11 LAB
ALBUMIN SERPL BCP-MCNC: 3.3 G/DL (ref 3.4–5)
ALP SERPL-CCNC: 47 U/L (ref 33–110)
ALT SERPL W P-5'-P-CCNC: 275 U/L (ref 7–45)
ANION GAP SERPL CALC-SCNC: 12 MMOL/L (ref 10–20)
AST SERPL W P-5'-P-CCNC: 629 U/L (ref 9–39)
BILIRUB SERPL-MCNC: 0.3 MG/DL (ref 0–1.2)
BUN SERPL-MCNC: 6 MG/DL (ref 6–23)
CALCIUM SERPL-MCNC: 9 MG/DL (ref 8.6–10.6)
CENTROMERE B AB SER-ACNC: <0.2 AI
CHLORIDE SERPL-SCNC: 100 MMOL/L (ref 98–107)
CHROMATIN AB SERPL-ACNC: <0.2 AI
CK SERPL-CCNC: ABNORMAL U/L (ref 0–215)
CO2 SERPL-SCNC: 26 MMOL/L (ref 21–32)
CREAT SERPL-MCNC: 0.42 MG/DL (ref 0.5–1.05)
CRP SERPL-MCNC: 10.17 MG/DL
DSDNA AB SER-ACNC: <1 IU/ML
EGFRCR SERPLBLD CKD-EPI 2021: >90 ML/MIN/1.73M*2
ENA JO1 AB SER QL IA: <0.2 AI
ENA RNP AB SER IA-ACNC: <0.2 AI
ENA SCL70 AB SER QL IA: <0.2 AI
ENA SM AB SER IA-ACNC: <0.2 AI
ENA SM+RNP AB SER QL IA: <0.2 AI
ENA SS-A AB SER IA-ACNC: <0.2 AI
ENA SS-B AB SER IA-ACNC: <0.2 AI
ERYTHROCYTE [DISTWIDTH] IN BLOOD BY AUTOMATED COUNT: 12.7 % (ref 11.5–14.5)
GLUCOSE SERPL-MCNC: 77 MG/DL (ref 74–99)
HCT VFR BLD AUTO: 36.8 % (ref 36–46)
HGB BLD-MCNC: 11.3 G/DL (ref 12–16)
MCH RBC QN AUTO: 26.1 PG (ref 26–34)
MCHC RBC AUTO-ENTMCNC: 30.7 G/DL (ref 32–36)
MCV RBC AUTO: 85 FL (ref 80–100)
NRBC BLD-RTO: 0 /100 WBCS (ref 0–0)
PLATELET # BLD AUTO: 338 X10*3/UL (ref 150–450)
POTASSIUM SERPL-SCNC: 4.5 MMOL/L (ref 3.5–5.3)
PROT SERPL-MCNC: 6.4 G/DL (ref 6.4–8.2)
RBC # BLD AUTO: 4.33 X10*6/UL (ref 4–5.2)
RIBOSOMAL P AB SER-ACNC: <0.2 AI
SODIUM SERPL-SCNC: 133 MMOL/L (ref 136–145)
WBC # BLD AUTO: 5.2 X10*3/UL (ref 4.4–11.3)

## 2025-02-11 PROCEDURE — 86140 C-REACTIVE PROTEIN: CPT | Performed by: INTERNAL MEDICINE

## 2025-02-11 PROCEDURE — 99232 SBSQ HOSP IP/OBS MODERATE 35: CPT | Performed by: INTERNAL MEDICINE

## 2025-02-11 PROCEDURE — 82550 ASSAY OF CK (CPK): CPT | Performed by: STUDENT IN AN ORGANIZED HEALTH CARE EDUCATION/TRAINING PROGRAM

## 2025-02-11 PROCEDURE — 2500000004 HC RX 250 GENERAL PHARMACY W/ HCPCS (ALT 636 FOR OP/ED): Performed by: INTERNAL MEDICINE

## 2025-02-11 PROCEDURE — 1200000002 HC GENERAL ROOM WITH TELEMETRY DAILY

## 2025-02-11 PROCEDURE — 2500000004 HC RX 250 GENERAL PHARMACY W/ HCPCS (ALT 636 FOR OP/ED): Performed by: STUDENT IN AN ORGANIZED HEALTH CARE EDUCATION/TRAINING PROGRAM

## 2025-02-11 PROCEDURE — 36415 COLL VENOUS BLD VENIPUNCTURE: CPT | Performed by: STUDENT IN AN ORGANIZED HEALTH CARE EDUCATION/TRAINING PROGRAM

## 2025-02-11 PROCEDURE — 84075 ASSAY ALKALINE PHOSPHATASE: CPT | Performed by: STUDENT IN AN ORGANIZED HEALTH CARE EDUCATION/TRAINING PROGRAM

## 2025-02-11 PROCEDURE — 2500000001 HC RX 250 WO HCPCS SELF ADMINISTERED DRUGS (ALT 637 FOR MEDICARE OP): Performed by: INTERNAL MEDICINE

## 2025-02-11 PROCEDURE — 2500000001 HC RX 250 WO HCPCS SELF ADMINISTERED DRUGS (ALT 637 FOR MEDICARE OP)

## 2025-02-11 PROCEDURE — 2500000004 HC RX 250 GENERAL PHARMACY W/ HCPCS (ALT 636 FOR OP/ED)

## 2025-02-11 PROCEDURE — 2500000001 HC RX 250 WO HCPCS SELF ADMINISTERED DRUGS (ALT 637 FOR MEDICARE OP): Performed by: STUDENT IN AN ORGANIZED HEALTH CARE EDUCATION/TRAINING PROGRAM

## 2025-02-11 PROCEDURE — 85027 COMPLETE CBC AUTOMATED: CPT | Performed by: STUDENT IN AN ORGANIZED HEALTH CARE EDUCATION/TRAINING PROGRAM

## 2025-02-11 RX ORDER — SODIUM CHLORIDE 9 MG/ML
200 INJECTION, SOLUTION INTRAVENOUS CONTINUOUS
Status: ACTIVE | OUTPATIENT
Start: 2025-02-11 | End: 2025-02-12

## 2025-02-11 RX ORDER — METHOCARBAMOL 500 MG/1
500 TABLET, FILM COATED ORAL EVERY 8 HOURS SCHEDULED
Status: DISCONTINUED | OUTPATIENT
Start: 2025-02-11 | End: 2025-02-14 | Stop reason: HOSPADM

## 2025-02-11 RX ADMIN — SENNOSIDES AND DOCUSATE SODIUM 2 TABLET: 50; 8.6 TABLET ORAL at 09:01

## 2025-02-11 RX ADMIN — SODIUM CHLORIDE 200 ML/HR: 9 INJECTION, SOLUTION INTRAVENOUS at 01:28

## 2025-02-11 RX ADMIN — OXYCODONE 10 MG: 5 TABLET ORAL at 05:26

## 2025-02-11 RX ADMIN — METHOCARBAMOL 500 MG: 500 TABLET ORAL at 22:07

## 2025-02-11 RX ADMIN — OXYCODONE 10 MG: 5 TABLET ORAL at 09:30

## 2025-02-11 RX ADMIN — SODIUM CHLORIDE 200 ML/HR: 9 INJECTION, SOLUTION INTRAVENOUS at 18:16

## 2025-02-11 RX ADMIN — METHOCARBAMOL 500 MG: 500 TABLET ORAL at 12:04

## 2025-02-11 RX ADMIN — ACETAMINOPHEN 975 MG: 325 TABLET ORAL at 15:26

## 2025-02-11 RX ADMIN — SENNOSIDES AND DOCUSATE SODIUM 2 TABLET: 50; 8.6 TABLET ORAL at 20:14

## 2025-02-11 RX ADMIN — HYDROMORPHONE HYDROCHLORIDE 0.2 MG: 1 INJECTION, SOLUTION INTRAMUSCULAR; INTRAVENOUS; SUBCUTANEOUS at 22:12

## 2025-02-11 RX ADMIN — ENOXAPARIN SODIUM 40 MG: 100 INJECTION SUBCUTANEOUS at 20:09

## 2025-02-11 RX ADMIN — OXYCODONE 10 MG: 5 TABLET ORAL at 15:26

## 2025-02-11 RX ADMIN — OXYCODONE 10 MG: 5 TABLET ORAL at 20:09

## 2025-02-11 RX ADMIN — POLYETHYLENE GLYCOL 3350 17 G: 17 POWDER, FOR SOLUTION ORAL at 20:14

## 2025-02-11 RX ADMIN — ACETAMINOPHEN 975 MG: 325 TABLET ORAL at 09:00

## 2025-02-11 RX ADMIN — SODIUM CHLORIDE 200 ML/HR: 9 INJECTION, SOLUTION INTRAVENOUS at 12:05

## 2025-02-11 RX ADMIN — POLYETHYLENE GLYCOL 3350 17 G: 17 POWDER, FOR SOLUTION ORAL at 08:59

## 2025-02-11 RX ADMIN — ACETAMINOPHEN 975 MG: 325 TABLET ORAL at 20:09

## 2025-02-11 RX ADMIN — SODIUM CHLORIDE 200 ML/HR: 9 INJECTION, SOLUTION INTRAVENOUS at 06:25

## 2025-02-11 RX ADMIN — HYDROMORPHONE HYDROCHLORIDE 0.2 MG: 1 INJECTION, SOLUTION INTRAMUSCULAR; INTRAVENOUS; SUBCUTANEOUS at 08:49

## 2025-02-11 RX ADMIN — SODIUM CHLORIDE 200 ML/HR: 9 INJECTION, SOLUTION INTRAVENOUS at 23:28

## 2025-02-11 ASSESSMENT — COGNITIVE AND FUNCTIONAL STATUS - GENERAL
MOBILITY SCORE: 24
DAILY ACTIVITIY SCORE: 24

## 2025-02-11 ASSESSMENT — PAIN SCALES - GENERAL
PAINLEVEL_OUTOF10: 10 - WORST POSSIBLE PAIN
PAINLEVEL_OUTOF10: 10 - WORST POSSIBLE PAIN
PAINLEVEL_OUTOF10: 9
PAINLEVEL_OUTOF10: 0 - NO PAIN
PAINLEVEL_OUTOF10: 10 - WORST POSSIBLE PAIN
PAINLEVEL_OUTOF10: 10 - WORST POSSIBLE PAIN
PAINLEVEL_OUTOF10: 0 - NO PAIN
PAINLEVEL_OUTOF10: 9
PAINLEVEL_OUTOF10: 10 - WORST POSSIBLE PAIN

## 2025-02-11 ASSESSMENT — PAIN DESCRIPTION - LOCATION
LOCATION: ABDOMEN
LOCATION: OTHER (COMMENT)

## 2025-02-11 ASSESSMENT — PAIN - FUNCTIONAL ASSESSMENT
PAIN_FUNCTIONAL_ASSESSMENT: 0-10

## 2025-02-11 ASSESSMENT — PAIN SCALES - PAIN ASSESSMENT IN ADVANCED DEMENTIA (PAINAD)
TOTALSCORE: MEDICATION (SEE MAR)
TOTALSCORE: MEDICATION (SEE MAR)

## 2025-02-11 ASSESSMENT — PAIN SCALES - WONG BAKER: WONGBAKER_NUMERICALRESPONSE: HURTS WORST

## 2025-02-11 NOTE — PROGRESS NOTES
Niya Au is a 34 y.o. female on day 2 of admission presenting with Non-traumatic rhabdomyolysis.      Subjective   No events over night.     Reports generalized muscle pains. No difference in proximal vs distal muscle pain.   No SOB.        Objective     Last Recorded Vitals  /74   Pulse 95   Temp 36.8 °C (98.2 °F)   Resp 16   Wt 102 kg (224 lb)   SpO2 98%   Intake/Output last 3 Shifts:    Intake/Output Summary (Last 24 hours) at 2/11/2025 1120  Last data filed at 2/11/2025 0916  Gross per 24 hour   Intake 480 ml   Output --   Net 480 ml       Admission Weight  Weight: 102 kg (224 lb) (02/09/25 0338)    Daily Weight  02/09/25 : 102 kg (224 lb)    Image Results  ECG 12 lead  Sinus tachycardia  Biatrial enlargement  Rightward axis  T wave abnormality, consider inferior ischemia  Abnormal ECG  When compared with ECG of 09-FEB-2025 03:41,  Sinus rhythm has replaced Atrial flutter  ST no longer elevated in Inferior leads  Nonspecific T wave abnormality now evident in Anterolateral leads    See ED provider note for full interpretation and clinical correlation  Confirmed by Fiona Rodriguez ([id number]) on 2/9/2025 4:48:56 AM    Vitals:    02/11/25 0740   BP: 120/74   Pulse: 95   Resp: 16   Temp: 36.8 °C (98.2 °F)   SpO2: 98%       Physical Exam  Constitutional:       Appearance: She is obese.   HENT:      Head: Normocephalic.      Nose: Nose normal.      Mouth/Throat:      Mouth: Mucous membranes are moist.   Eyes:      Extraocular Movements: Extraocular movements intact.      Pupils: Pupils are equal, round, and reactive to light.   Cardiovascular:      Rate and Rhythm: Normal rate and regular rhythm.      Heart sounds: Normal heart sounds. No murmur heard.  Pulmonary:      Effort: No respiratory distress.      Breath sounds: Normal breath sounds. No wheezing.   Abdominal:      General: There is no distension.      Palpations: Abdomen is soft.      Tenderness: There is no abdominal tenderness.    Musculoskeletal:         General: Normal range of motion.      Cervical back: Normal range of motion.   Skin:     General: Skin is warm.   Neurological:      General: No focal deficit present.      Mental Status: She is alert and oriented to person, place, and time.   Psychiatric:         Mood and Affect: Mood normal.         Relevant Results  Scheduled medications  acetaminophen, 975 mg, oral, TID  enoxaparin, 40 mg, subcutaneous, q24h  methocarbamol, 500 mg, oral, q8h ISAI  polyethylene glycol, 17 g, oral, BID  sennosides-docusate sodium, 2 tablet, oral, BID      Continuous medications  sodium chloride 0.9%, 200 mL/hr      PRN medications  PRN medications: HYDROmorphone, oxyCODONE, oxyCODONE    Results for orders placed or performed during the hospital encounter of 02/09/25 (from the past 24 hours)   CBC   Result Value Ref Range    WBC 5.2 4.4 - 11.3 x10*3/uL    nRBC 0.0 0.0 - 0.0 /100 WBCs    RBC 4.33 4.00 - 5.20 x10*6/uL    Hemoglobin 11.3 (L) 12.0 - 16.0 g/dL    Hematocrit 36.8 36.0 - 46.0 %    MCV 85 80 - 100 fL    MCH 26.1 26.0 - 34.0 pg    MCHC 30.7 (L) 32.0 - 36.0 g/dL    RDW 12.7 11.5 - 14.5 %    Platelets 338 150 - 450 x10*3/uL   Comprehensive Metabolic Panel   Result Value Ref Range    Glucose 77 74 - 99 mg/dL    Sodium 133 (L) 136 - 145 mmol/L    Potassium 4.5 3.5 - 5.3 mmol/L    Chloride 100 98 - 107 mmol/L    Bicarbonate 26 21 - 32 mmol/L    Anion Gap 12 10 - 20 mmol/L    Urea Nitrogen 6 6 - 23 mg/dL    Creatinine 0.42 (L) 0.50 - 1.05 mg/dL    eGFR >90 >60 mL/min/1.73m*2    Calcium 9.0 8.6 - 10.6 mg/dL    Albumin 3.3 (L) 3.4 - 5.0 g/dL    Alkaline Phosphatase 47 33 - 110 U/L    Total Protein 6.4 6.4 - 8.2 g/dL     (H) 9 - 39 U/L    Bilirubin, Total 0.3 0.0 - 1.2 mg/dL     (H) 7 - 45 U/L   Creatine Kinase   Result Value Ref Range    Creatine Kinase >40,000 (H) 0 - 215 U/L       Assessment/Plan      Ms. Au is a 34 year old F with PMH of recurrent rhabdomyolysis ( last admission  "reported as Jan 2020) who is presented with generalized muscle pains since last Friday with CK >100K c/w rhabdomyolysis. Pt notes that she has had viral URI sx last week with cough, pleuritic CP and rhinorrhea. COVID/flu/RSV tests are negative. Per chart review, patient has had an extensive metabolic workup in the past as well as muscle bx that was unrevealing. 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   Currently CK is > 40 K and she is on IV fluids.      Rhabdomyolysis in the setting of recent viral URI  Elevated transaminases  Recurrent rhabdo   - TSH 0.10, free T4 0.93   - s/p 2L LR in the ED. C/w NS at 200cc/hr until CK <5K  - Strict I/Os, goal -300cc/h, diuresis PRN for s/s V overload   - monitor CK, RFP q12h (monitor for hyperkalemia/hypocalcemia)   - myositis panel,   UDS positive for Marijuana,   - hepatitis panel   - pain regimen: tylenol 975mg TID, oxy 5mg q4h PRN, oxy 10mg q4h PRN, IV dilaudid 0.2mg q3h PRN for breakthrough   Start Methocarbamol 500 mg Q 8hrs.   - bowel reg   - Will cont. IVF @200cc/hr a  - Spoke with genetics, recommended metabolism labs and will follow-up with as an outpatient (their office will call the patient)  Rheumatology is consulted, recs appreciated.            F: Cont. NS 200cc/h  E: PRN, RFP q12h   N: Regular  A: PIV  DVT ppx: S/c lovenox  Code Status: Full Code  NOK: júnior Fortune [phone]        Ren Banuelos MD      "

## 2025-02-11 NOTE — CONSULTS
Rheumatology New Consult Note      Patient Niya Au PCP Clarita Montano MD    MRN [id number]  Admission Date 2/9/2025      Chief Complaint/Reason for Consult:  Suspected myositis    Subjective    Subjective   History of Presenting Illness  Ms. Niya Au is a 34 y.o. female with a past medical history of recurrent rhabdomyolysis, obesity, DULCE, tonsillar hypertrophy s/p tonsillectomy, vit D deficiency who presented to the ED on 2/9/2025 due to myalgias and gross hematuria.  Rheumatology was consulted due to concern for possible myositis.      The patient reports she has had a history of recurrent rhabdomyolysis, for approximately 19 years.  She used to have episodes yearly since eighth grade until 2017, then another episode in 2020, and now most recently 2/2025. She reports that every episode in her lifetime has been triggered by some sort of infection, such as strep throat, or flu.  Her typical presentation is approximately 1 week after onset of infection, she starts developing severe body aches and dark urine.  This most recent episode, she reports she began to have URI symptoms including fevers and chills around 1/29-1/30.  She began to feel better from her URI, but started developing body aches approximately 1 week after, and then developed dark urine on 2/9/2025, which prompted her to come in for evaluation.  The patient reports that she does have a lot of muscle pain during these episodes, which can lead to weakness, but between episodes, she does not have any symptoms.    Pertinent work-up this episode:  2/9/25 CK 108k, still >00139  ALT elevated 220 -> 226 -> 275  AST elevated 552 -> 544 -> 629  CMP initially normal Na, now mild hyponatremia 133, normal renal function, initially normal albumin now hypoalbuminemia 3.3  Fibrinogen elevated 452  D-dimer elevated 895  CBC initially normal, now normocytic anemia 11.3 (?dilutional), no cytopenias. 2/11 CBC no diff, but 2/9 CBC with elevated  "ANC  Hepatitis A, B, C normal  COVID negative  UA with +blood, +protein, no RBC, 1+ protein  UDS cannibinoid +    Previous work-up:  4/2017 rheum labs:  Anti Nuclear Antibody Panel (with automatic CARLA Panel)   Mckay Extractable Nuclear Antibody <0.2   Anti Sm/Rnp <0.2   Anti SSA <0.2   Anti SSB <0.2   Antibody to Antiscleroderma-70 <0.2   Antibody to ACACIA-1 <0.2   Anti-Chromatin <0.2   Anti-Centromere <0.2   Antibody Assay, Ribosomal P Protein <0.2  Anti-dsDNA (Double Stranded) Antibodies <1.0   RNP Antibody <0.2   10/21/2009:RIGHT THIGH MUSCLE, BIOPSY: -- SKELETAL MUSCLE, NO HISTOPATHOLOGICAL DIAGNOSIS.   Note: There is no inflammation or vasculitis. Muscle fiber degeneration is not identified. Trichrome staining demonstrates no ragged red fibers. NADH and cytochrome oxidase stains are within normal limits. Oil red O. demonstrates no evidence of lipid storage. Ultrastructural examination shows normal mitochondrial distribution and morphology. There is no increase in lipid or glycogen within the muscle fibers.    The patient has been evaluated by rheumatology in the past, both in 2012 and most recently in 2017.  During both previous rheumatology evaluations, it was thought that recurrent episodes of rhabdomyolysis were not an autoimmune rheumatologic process, especially given previous normal serologies and previous normal muscle biopsy results.  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 Patient was also recommended in 2017 to have genomic sequencing and neuromuscular evaluation.    The patient did see metabolism in the past, with note as below:  \"Previous metabolic work-up has included:    Muscle biopsy 10/21/2009: no inflammation, necrotizing, or vasculitic changes " "with normal histology and ultrastructure and no evidence of glycogen or lipid accumulation    Normal CPT-I and low normal CPT-II enzyme activity in muscle    Normal mitochondrial function (includes completely normal oxidation of amino acids, fatty acids and pyruvate, normal coupling of oxidation and phosphorylation, and normal oxidation of artificial substrates designed to individually interogate complexes I, II, III and IV of the respiratory chain)    Normal acylcarnitine profiles (plasma, urine and skeletal muscle)    Normal urine organic acids    Normal plasma amino acids    Normal plasma lactate/pyruvate profiles    PYGM common mutations panel (for Mcardle disease- GSD V) was negative for a mutation and a AMPD1 common mutations panel (myoadenylate deaminase deficiency) was also negative. These panels only test for the most common mutations and do not rule other mutations.    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\"      ROS:  Constitutional: Denies fever, chills, fatigue  Head: Denies headaches or hair loss  Eyes: Denies dry eyes, blurry vision, redness of the eyes, pain in the eyes or H/O inflammatory eye disease  ENT: Denies dry mouth, loss of taste, sores in the mouth, nose bleed, or difficulty swallowing  Cardiovascular: Denies chest pain, palpitations  Respiratory: Denies shortness of breath or cough or wheezing  Gastrointestinal: Denies nausea, vomiting, heartburn, abdominal pain , diarrhea or blood in the stool  Genitourinary: No urinary urgency, frequency, dysuria   Integumentary: Denies photosensitivity, rash or lesions, Raynaud's or psoriasis  Neurological: Denies any numbness or tingling or weakness  Hematologic/Lymphatic: Denies bleeding, bruising, Raynaud's phenomenon  MSK: As per HPI. No enthesitis, sausage finger, finger tip ulceration, or back pain    Past Medical History:   Diagnosis Date    Anemia complicating " pregnancy, unspecified trimester ([facility]) 02/13/2018    Anemia in pregnancy    Encounter for screening for infections with a predominantly sexual mode of transmission     Routine screening for STI (sexually transmitted infection)    Encounter for screening for malignant neoplasm of cervix     Screening for cervical cancer    Encounter for supervision of other normal pregnancy, unspecified trimester ([facility]) 02/07/2018    Prenatal care of multigravida, antepartum    Encounter for supervision of other normal pregnancy, unspecified trimester ([facility]) 01/19/2018    Prenatal care, subsequent pregnancy    Obesity complicating pregnancy, unspecified trimester ([facility]) 01/24/2018    Obesity in pregnancy    Obesity, unspecified 01/03/2018    Obesity, Class II, BMI 35-39.9    Other specified personal risk factors, not elsewhere classified     At risk of UTI    Personal history of other diseases of the musculoskeletal system and connective tissue 07/14/2017    History of rhabdomyolysis    Personal history of other diseases of the respiratory system 10/20/2017    History of paranasal sinus congestion    Personal history of other diseases of the respiratory system 08/25/2017    History of sinusitis    Personal history of other infectious and parasitic diseases 02/13/2018    History of herpes genitalis    Supervision of pregnancy with grand multiparity, third trimester ([facility]) 02/13/2018    High risk multigravida, third trimester    Vomiting of pregnancy, unspecified 07/14/2017    Nausea and vomiting during pregnancy        Allergies   Allergen Reactions    Amoxicillin Myalgia    Penicillins Myalgia        Objective   Objective     Patient Vitals for the past 24 hrs:   BP Temp Temp src Pulse Resp SpO2   02/11/25 0740 120/74 36.8 °C (98.2 °F) -- 95 16 98 %   02/11/25 0309 125/63 37.2 °C (99 °F) Temporal 87 18 97 %   02/10/25 2300 116/69 36.7 °C (98.1 °F) Temporal 81 18 98 %   02/10/25 1952 106/58 36.6 °C (97.9 °F) -- 83 --  98 %   02/10/25 1609 118/73 36.7 °C (98.1 °F) -- 97 -- 96 %   02/10/25 1107 119/66 36.6 °C (97.9 °F) Temporal 84 18 96 %        PHYSICAL EXAM:  General - NAD, pleasant, AAOx3  Head: Normocephalic, atraumatic  Eyes - PERRLA, EOMI. No conjunctiva injection.   Mouth/ENT - Moist oral and nasal mucosa. No facial rash.   Cardiovascular - Regular rate and rhythm. No murmurs or rubs.  Lungs - Clear to auscultation bilaterally.   Skin - No rashes or ulcers. No erythema on bilateral cheeks.  Extremities - No edema, cyanosis ,or clubbing  Neurological - Alert and oriented x 3,  grossly intact. No focal deficit. Motor strength 5/5 in all extremities.    Musculoskeletal  Shoulders: Full ROM, without pain, no swelling, warmth or tenderness.  Elbows: Full ROM, without pain, no swelling, warmth or tenderness.  Wrists: Full ROM, without pain, no swelling, warmth or tenderness.  MCP: No swelling, warmth or tenderness. Metacarpal squeeze negative  PIP: No swelling, warmth or tenderness.  DIP: No swelling, warmth or tenderness.  Hips: Full ROM.  No malalignment.  Knees:  Full ROM, without pain, no swelling, warmth or point tenderness. No joint line tenderness, no pes anserine tenderness.  Ankles: Full ROM, without pain, swelling, warmth or tenderness.  Toes: No swelling, warmth or tenderness. Metatarsal squeeze negative     LABS:  Results from last 7 days   Lab Units 02/11/25  0647 02/09/25  0521   WBC AUTO x10*3/uL 5.2 10.3   RBC AUTO x10*6/uL 4.33 4.84   HEMOGLOBIN g/dL 11.3* 12.8   MCV fL 85 81     Results from last 7 days   Lab Units 02/11/25  0647 02/10/25  0716 02/09/25  1802 02/09/25  0521   SODIUM mmol/L 133* 132* 135* 136   POTASSIUM mmol/L 4.5 4.4 4.1 4.1   CHLORIDE mmol/L 100 100 101 98   CO2 mmol/L 26 27 29 26   BUN mg/dL 6 8 9 14   CREATININE mg/dL 0.42* 0.39* 0.43* 0.49*   EGFR mL/min/1.73m*2 >90 >90 >90 >90   CALCIUM mg/dL 9.0 8.7 8.4* 9.7   PHOSPHORUS mg/dL  --  3.3 3.7 4.0   MAGNESIUM mg/dL  --  1.88 1.94 2.08  "  ALBUMIN g/dL 3.3* 3.2* 3.3* 4.2   BILIRUBIN TOTAL mg/dL 0.3 0.4  --  0.4   ALT U/L 275* 226*  --  220*   AST U/L 629* 544*  --  552*   ALK PHOS U/L 47 46  --  63   PROTEIN TOTAL g/dL 6.4 6.1*  --  8.1     No results found for: \"CRP\"  No results found for: \"SEDRATE\"  No results found for: \"C3\", \"C4\"  No results found for: \"RF\", \"CITAB\"  No results found for: \"ANATITER\", \"ARNP\", \"ASMRN\", \"ASSA\", \"ASSB\", \"ASCL\", \"JO1\", \"ACHR\", \"ACEN\", \"RIBPP\", \"DNADS\", \"ANCPA\", \"ANCTI\", \"PR3\", \"MPO\"  Lab Results   Component Value Date    URICACID 3.8 02/02/2018       No results found for: \"COLORFL\", \"CLARITYFLUID\", \"WBCFL\", \"NEUTROBFREL\", \"LYMPHSBFREL\", \"EOSBFREL\", \"BASOBFREL\", \"PLASMACFLD\", \"RBCFL\", \"CRYSFL\"    IMAGING:  === 04/04/22 ===    CHEST 2 VIEW    - Impression -  Mild right basilar atelectasis without focal consolidation. No  evidence of acute cardiopulmonary process.    I personally reviewed the images/study and I agree with the findings  as stated. This study was interpreted at [facility], [place], [place].         No results found.     Assessment    Assessment & Plan   Ms. Niya Au is a 34 y.o. female with a past medical history of recurrent rhabdomyolysis, obesity, DULCE, tonsillar hypertrophy s/p tonsillectomy, vit D deficiency who presented to the ED on 2/9/2025 due to myalgias and gross hematuria.  Rheumatology was consulted due to concern for possible myositis.      Pertinent work-up this episode:  2/9/25 CK 108k, still >94309  ALT elevated 220 -> 226 -> 275  AST elevated 552 -> 544 -> 629  CMP initially normal Na, now mild hyponatremia 133, normal renal function, initially normal albumin now hypoalbuminemia 3.3  Fibrinogen elevated 452  D-dimer elevated 895  CBC initially normal, now normocytic anemia 11.3 (?dilutional), no cytopenias. 2/11 CBC no diff, but 2/9 CBC with elevated ANC  Hepatitis A, B, C normal  COVID negative  UA with +blood, +protein, no RBC, 1+ protein  UDS " cannibinoid +  Vitamin D low 8    Previous work-up:  4/2017 rheum labs:  Anti Nuclear Antibody Panel (with automatic CARLA Panel)   Mckay Extractable Nuclear Antibody <0.2   Anti Sm/Rnp <0.2   Anti SSA <0.2   Anti SSB <0.2   Antibody to Antiscleroderma-70 <0.2   Antibody to ACACIA-1 <0.2   Anti-Chromatin <0.2   Anti-Centromere <0.2   Antibody Assay, Ribosomal P Protein <0.2  Anti-dsDNA (Double Stranded) Antibodies <1.0   RNP Antibody <0.2   10/21/2009:RIGHT THIGH MUSCLE, BIOPSY: -- SKELETAL MUSCLE, NO HISTOPATHOLOGICAL DIAGNOSIS.   Note: There is no inflammation or vasculitis. Muscle fiber degeneration is not identified. Trichrome staining demonstrates no ragged red fibers. NADH and cytochrome oxidase stains are within normal limits. Oil red O. demonstrates no evidence of lipid storage. Ultrastructural examination shows normal mitochondrial distribution and morphology. There is no increase in lipid or glycogen within the muscle fibers.    Previous metabolic work-up -   Muscle biopsy 10/21/2009: no inflammation, necrotizing, or vasculitic changes with normal histology and ultrastructure and no evidence of glycogen or lipid accumulation    Normal CPT-I and low normal CPT-II enzyme activity in muscle    Normal mitochondrial function (includes completely normal oxidation of amino acids, fatty acids and pyruvate, normal coupling of oxidation and phosphorylation, and normal oxidation of artificial substrates designed to individually interogate complexes I, II, III and IV of the respiratory chain)    Normal acylcarnitine profiles (plasma, urine and skeletal muscle)    Normal urine organic acids    Normal plasma amino acids    Normal plasma lactate/pyruvate profiles    PYGM common mutations panel (for Mcardle disease- GSD V) was negative for a mutation and a AMPD1 common mutations panel (myoadenylate deaminase deficiency) was also negative. These panels only test for the most common mutations and do not rule other mutations.     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  However, etiology of recurrent episodes of rhabdomyolysis is still not clear.    Acute episode of recurrent rhabdomyolysis, likely 2/2 recent URI  Normocytic anemia  Severe vitamin D deficiency    Recommendations:  Obtain CRP, ESR, GABRIEL + CARLA panel  Will follow extended myositis panel results  Trend CK and CMP daily  Continue IV hydration and pain control as per primary team  Recommend starting vitamin D supplementation  Patient would benefit from outpatient genetics/metabolism follow-up on discharge (re-establish care)    The rest per the primary team.    Thank you for this interesting consult. Will continue to follow.      Independently reviewed three or more labs  Images including CXR, CT, MRI independently reviewed.   Monitoring for drug toxicity   Discussed with patient, nursing staff and primary team     Patient seen and discussed with Dr. Arriaga.    Nissa Navarro MD  Rheumatology Fellow, PGY-4    Her presentation is not suggestive of a rheumatologic problem but is more c/w a metabolic myopathy, although extensive workup so far has not identified the cause    Elda Arriaga M.D.

## 2025-02-12 LAB
(HCYS)2 SERPL QL: <5 UMOL/L
A-AMINOBUTYR SERPL QL: 32.2 UMOL/L
AAA SERPL-SCNC: <5 UMOL/L
ALANINE SERPL-SCNC: 273 UMOL/L (ref 160–530)
ALBUMIN SERPL BCP-MCNC: 3.2 G/DL (ref 3.4–5)
ALLOISOLEUCINE SERPL QL: <5 UMOL/L
ALP SERPL-CCNC: 48 U/L (ref 33–110)
ALT SERPL W P-5'-P-CCNC: 291 U/L (ref 7–45)
ANA PATTERN: ABNORMAL
ANA SER QL HEP2 SUBST: POSITIVE
ANA TITR SER IF: ABNORMAL {TITER}
ANION GAP SERPL CALC-SCNC: 11 MMOL/L (ref 10–20)
ANSERINE SERPL-SCNC: <20 UMOL/L
ARGININE SERPL-SCNC: 41.9 UMOL/L (ref 35–125)
ARGININOSUCCINATE SERPL-SCNC: <20 UMOL/L
ASPARAGINE/CREAT UR-RTO: 33.2 UMOL/L (ref 20–80)
ASPARTATE SERPL-SCNC: <5 UMOL/L
AST SERPL W P-5'-P-CCNC: 538 U/L (ref 9–39)
B-AIB SERPL-SCNC: <5 UMOL/L
B-ALANINE SERPL-SCNC: 9.9 UMOL/L
BILIRUB SERPL-MCNC: 0.2 MG/DL (ref 0–1.2)
BUN SERPL-MCNC: 6 MG/DL (ref 6–23)
CALCIUM SERPL-MCNC: 8.7 MG/DL (ref 8.6–10.6)
CHLORIDE SERPL-SCNC: 101 MMOL/L (ref 98–107)
CITRULLINE SERPL-SCNC: 13.6 UMOL/L (ref 10–45)
CK SERPL-CCNC: ABNORMAL U/L (ref 0–215)
CO2 SERPL-SCNC: 27 MMOL/L (ref 21–32)
CREAT SERPL-MCNC: 0.37 MG/DL (ref 0.5–1.05)
CYSTATHIONIN SERPL-SCNC: <5 UMOL/L
CYSTINE SERPL-SCNC: 56.7 UMOL/L (ref 10–65)
EGFRCR SERPLBLD CKD-EPI 2021: >90 ML/MIN/1.73M*2
ERYTHROCYTE [SEDIMENTATION RATE] IN BLOOD BY WESTERGREN METHOD: 61 MM/H (ref 0–20)
ETHANOLAMINE SERPL-SCNC: 5.9 UMOL/L
GABA SERPL-SCNC: <5 UMOL/L
GLUCOSE SERPL-MCNC: 74 MG/DL (ref 74–99)
GLUTAMATE SERPL-SCNC: 25.2 UMOL/L (ref 15–130)
GLUTAMINE SERPL-SCNC: 396.2 UMOL/L (ref 380–680)
GLYCINE SERPL-SCNC: 129 UMOL/L (ref 140–420)
HISTIDINE SERPL-SCNC: 44 UMOL/L (ref 50–130)
HOMOCITRULLINE SERPL-SCNC: <5 UMOL/L
ISOLEUCINE SERPL-SCNC: 62.3 UMOL/L (ref 30–120)
LEUCINE SERPL QL: 99 UMOL/L (ref 60–180)
LYSINE SERPL-ACNC: 156.4 UMOL/L (ref 85–230)
METHIONINE SERPL-SCNC: 25.1 UMOL/L (ref 15–40)
OH-LYSINE SERPL-SCNC: 7.2 UMOL/L
OH-PROLINE SERPL-SCNC: 20 UMOL/L (ref 5–40)
ORNITHINE SERPL-SCNC: 41.9 UMOL/L (ref 25–110)
PATH REV BLD -IMP: ABNORMAL
PHE SERPL-SCNC: 54.5 UMOL/L (ref 30–82)
PHE/TYR RATIO: 0.8
POTASSIUM SERPL-SCNC: 4.2 MMOL/L (ref 3.5–5.3)
PROLINE SERPL-SCNC: 169 UMOL/L (ref 90–350)
PROT SERPL-MCNC: 6.1 G/DL (ref 6.4–8.2)
SARCOSINE SERPL-SCNC: <5 UMOL/L
SERINE/CREAT UR-RTO: 82.7 UMOL/L (ref 60–170)
SODIUM SERPL-SCNC: 135 MMOL/L (ref 136–145)
TAURINE SERPL-SCNC: 47.7 UMOL/L (ref 30–130)
THREONINE SERPL-SCNC: 130.5 UMOL/L (ref 60–190)
TRYPTOPHAN SERPL QL: 43.1 UMOL/L (ref 25–80)
TYROSINE SERPL-SCNC: 70.2 UMOL/L (ref 35–110)
VALINE SERPL-SCNC: 193.5 UMOL/L (ref 120–320)

## 2025-02-12 PROCEDURE — 82550 ASSAY OF CK (CPK): CPT | Performed by: INTERNAL MEDICINE

## 2025-02-12 PROCEDURE — 80053 COMPREHEN METABOLIC PANEL: CPT | Performed by: INTERNAL MEDICINE

## 2025-02-12 PROCEDURE — 2500000001 HC RX 250 WO HCPCS SELF ADMINISTERED DRUGS (ALT 637 FOR MEDICARE OP): Performed by: INTERNAL MEDICINE

## 2025-02-12 PROCEDURE — 2500000001 HC RX 250 WO HCPCS SELF ADMINISTERED DRUGS (ALT 637 FOR MEDICARE OP): Performed by: STUDENT IN AN ORGANIZED HEALTH CARE EDUCATION/TRAINING PROGRAM

## 2025-02-12 PROCEDURE — 2500000001 HC RX 250 WO HCPCS SELF ADMINISTERED DRUGS (ALT 637 FOR MEDICARE OP)

## 2025-02-12 PROCEDURE — 2500000004 HC RX 250 GENERAL PHARMACY W/ HCPCS (ALT 636 FOR OP/ED): Performed by: STUDENT IN AN ORGANIZED HEALTH CARE EDUCATION/TRAINING PROGRAM

## 2025-02-12 PROCEDURE — 99232 SBSQ HOSP IP/OBS MODERATE 35: CPT | Performed by: INTERNAL MEDICINE

## 2025-02-12 PROCEDURE — 1210000001 HC SEMI-PRIVATE ROOM DAILY

## 2025-02-12 PROCEDURE — 2500000004 HC RX 250 GENERAL PHARMACY W/ HCPCS (ALT 636 FOR OP/ED): Performed by: INTERNAL MEDICINE

## 2025-02-12 PROCEDURE — 2500000004 HC RX 250 GENERAL PHARMACY W/ HCPCS (ALT 636 FOR OP/ED)

## 2025-02-12 PROCEDURE — 36415 COLL VENOUS BLD VENIPUNCTURE: CPT | Performed by: INTERNAL MEDICINE

## 2025-02-12 PROCEDURE — 85652 RBC SED RATE AUTOMATED: CPT | Performed by: INTERNAL MEDICINE

## 2025-02-12 RX ORDER — SODIUM CHLORIDE 9 MG/ML
200 INJECTION, SOLUTION INTRAVENOUS CONTINUOUS
Status: DISCONTINUED | OUTPATIENT
Start: 2025-02-12 | End: 2025-02-13

## 2025-02-12 RX ORDER — ERGOCALCIFEROL 1.25 MG/1
1250 CAPSULE ORAL WEEKLY
Status: DISCONTINUED | OUTPATIENT
Start: 2025-02-12 | End: 2025-02-14 | Stop reason: HOSPADM

## 2025-02-12 RX ADMIN — SODIUM CHLORIDE 200 ML/HR: 9 INJECTION, SOLUTION INTRAVENOUS at 14:47

## 2025-02-12 RX ADMIN — ENOXAPARIN SODIUM 40 MG: 100 INJECTION SUBCUTANEOUS at 21:40

## 2025-02-12 RX ADMIN — ACETAMINOPHEN 975 MG: 325 TABLET ORAL at 14:47

## 2025-02-12 RX ADMIN — OXYCODONE 10 MG: 5 TABLET ORAL at 03:53

## 2025-02-12 RX ADMIN — HYDROMORPHONE HYDROCHLORIDE 0.2 MG: 1 INJECTION, SOLUTION INTRAMUSCULAR; INTRAVENOUS; SUBCUTANEOUS at 17:16

## 2025-02-12 RX ADMIN — OXYCODONE 10 MG: 5 TABLET ORAL at 22:52

## 2025-02-12 RX ADMIN — SODIUM CHLORIDE 200 ML/HR: 9 INJECTION, SOLUTION INTRAVENOUS at 04:23

## 2025-02-12 RX ADMIN — ACETAMINOPHEN 975 MG: 325 TABLET ORAL at 21:40

## 2025-02-12 RX ADMIN — OXYCODONE 10 MG: 5 TABLET ORAL at 14:49

## 2025-02-12 RX ADMIN — SENNOSIDES AND DOCUSATE SODIUM 2 TABLET: 50; 8.6 TABLET ORAL at 09:41

## 2025-02-12 RX ADMIN — METHOCARBAMOL 500 MG: 500 TABLET ORAL at 14:47

## 2025-02-12 RX ADMIN — SODIUM CHLORIDE 200 ML/HR: 9 INJECTION, SOLUTION INTRAVENOUS at 17:16

## 2025-02-12 RX ADMIN — ERGOCALCIFEROL 1250 MCG: 1.25 CAPSULE ORAL at 09:42

## 2025-02-12 RX ADMIN — POLYETHYLENE GLYCOL 3350 17 G: 17 POWDER, FOR SOLUTION ORAL at 09:42

## 2025-02-12 RX ADMIN — METHOCARBAMOL 500 MG: 500 TABLET ORAL at 21:40

## 2025-02-12 RX ADMIN — OXYCODONE 10 MG: 5 TABLET ORAL at 09:41

## 2025-02-12 RX ADMIN — ACETAMINOPHEN 975 MG: 325 TABLET ORAL at 09:42

## 2025-02-12 RX ADMIN — HYDROMORPHONE HYDROCHLORIDE 0.2 MG: 1 INJECTION, SOLUTION INTRAMUSCULAR; INTRAVENOUS; SUBCUTANEOUS at 06:10

## 2025-02-12 RX ADMIN — METHOCARBAMOL 500 MG: 500 TABLET ORAL at 06:11

## 2025-02-12 ASSESSMENT — PAIN SCALES - GENERAL
PAINLEVEL_OUTOF10: 0 - NO PAIN
PAINLEVEL_OUTOF10: 10 - WORST POSSIBLE PAIN
PAINLEVEL_OUTOF10: 8
PAINLEVEL_OUTOF10: 9
PAINLEVEL_OUTOF10: 10 - WORST POSSIBLE PAIN
PAINLEVEL_OUTOF10: 9

## 2025-02-12 ASSESSMENT — PAIN - FUNCTIONAL ASSESSMENT
PAIN_FUNCTIONAL_ASSESSMENT: 0-10

## 2025-02-12 ASSESSMENT — PAIN DESCRIPTION - LOCATION
LOCATION: ABDOMEN
LOCATION: ABDOMEN

## 2025-02-12 ASSESSMENT — ACTIVITIES OF DAILY LIVING (ADL): LACK_OF_TRANSPORTATION: NO

## 2025-02-12 NOTE — PROGRESS NOTES
Niya Au is a 34 y.o. female on day 3 of admission presenting with Non-traumatic rhabdomyolysis.      Subjective   No events over night.     Reports some improvement in generalized muscle pains.    No SOB.        Objective     Last Recorded Vitals  /63   Pulse 80   Temp 36.5 °C (97.7 °F)   Resp 18   Wt 102 kg (224 lb)   SpO2 95%   Intake/Output last 3 Shifts:    Intake/Output Summary (Last 24 hours) at 2/12/2025 0822  Last data filed at 2/11/2025 0916  Gross per 24 hour   Intake 480 ml   Output --   Net 480 ml       Admission Weight  Weight: 102 kg (224 lb) (02/09/25 0338)    Daily Weight  02/09/25 : 102 kg (224 lb)    Image Results  ECG 12 lead  Sinus tachycardia  Biatrial enlargement  Rightward axis  T wave abnormality, consider inferior ischemia  Abnormal ECG  When compared with ECG of 09-FEB-2025 03:41,  Sinus rhythm has replaced Atrial flutter  ST no longer elevated in Inferior leads  Nonspecific T wave abnormality now evident in Anterolateral leads    See ED provider note for full interpretation and clinical correlation  Confirmed by Fiona Rodriguez ([id number]) on 2/9/2025 4:48:56 AM    Vitals:    02/12/25 0817   BP: 107/63   Pulse: 80   Resp: 18   Temp: 36.5 °C (97.7 °F)   SpO2: 95%       Physical Exam  Constitutional:       Appearance: She is obese.   HENT:      Head: Normocephalic.      Nose: Nose normal.      Mouth/Throat:      Mouth: Mucous membranes are moist.   Eyes:      Extraocular Movements: Extraocular movements intact.      Pupils: Pupils are equal, round, and reactive to light.   Cardiovascular:      Rate and Rhythm: Normal rate and regular rhythm.      Heart sounds: Normal heart sounds. No murmur heard.  Pulmonary:      Effort: No respiratory distress.      Breath sounds: Normal breath sounds. No wheezing.   Abdominal:      General: There is no distension.      Palpations: Abdomen is soft.      Tenderness: There is no abdominal tenderness.   Musculoskeletal:         General: Normal  "range of motion.      Cervical back: Normal range of motion.   Skin:     General: Skin is warm.   Neurological:      General: No focal deficit present.      Mental Status: She is alert and oriented to person, place, and time.   Psychiatric:         Mood and Affect: Mood normal.         Relevant Results  Scheduled medications  acetaminophen, 975 mg, oral, TID  enoxaparin, 40 mg, subcutaneous, q24h  ergocalciferol, 1,250 mcg, oral, Weekly  methocarbamol, 500 mg, oral, q8h ISAI  polyethylene glycol, 17 g, oral, BID  sennosides-docusate sodium, 2 tablet, oral, BID      Continuous medications  sodium chloride 0.9%, 200 mL/hr, Last Rate: 200 mL/hr (02/12/25 4448)      PRN medications  PRN medications: HYDROmorphone, oxyCODONE, oxyCODONE    No results found for this or any previous visit (from the past 24 hours).      Assessment/Plan      Ms. Au is a 34 year old F with PMH of recurrent rhabdomyolysis ( last admission reported as Jan 2020) who is presented with generalized muscle pains since last Friday with CK >100K c/w rhabdomyolysis. Pt notes that she has had viral URI sx last week with cough, pleuritic CP and rhinorrhea. COVID/flu/RSV tests are negative. Per chart review, patient has had an extensive metabolic workup in the past as well as muscle bx that was unrevealing. 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   Currently CK is > 40 K and she is on IV fluids.      Rhabdomyolysis in the setting of recent viral URI  Elevated transaminases  Recurrent rhabdo   - TSH 0.10, free T4 0.93   - s/p 2L LR in the ED. C/w NS at 200cc/hr until CK <5K  - Strict I/Os, goal -300cc/h, diuresis PRN for s/s V overload   - monitor CK, RFP q12h   - myositis panel,   UDS positive for Marijuana,   - hepatitis panel   - pain regimen: tylenol 975mg TID, oxy 5mg q4h PRN, oxy 10mg q4h PRN, IV dilaudid 0.2mg " q3h PRN for breakthrough   Continue Methocarbamol 500 mg Q 8hrs.   - bowel reg   Cont. IVF @200cc/hr a  Genetics, recommended metabolism labs and follow-up as an outpatient (their office will call the patient)  Rheumatology is consulted, recs appreciated.            F: Cont. NS 200cc/h  E: PRN, RFP q12h   N: Regular  A: PIV  DVT ppx: S/c lovenox  Code Status: Full Code  NOK: júnior Fortune [phone]     Dispo: Improvement in muscle symptoms.        Ren Banuelos MD

## 2025-02-12 NOTE — CARE PLAN
The patient's goals for the shift include  pain relief    The clinical goals for the shift include Patient will report adequate painn ccontrol throughout shift

## 2025-02-12 NOTE — PROGRESS NOTES
Rheumatology Progress Note   Reason for Consult:  Suspected myositis     History of Presenting Illness  Ms. Niya Au is a 34 y.o. female with a past medical history of recurrent rhabdomyolysis, obesity, DULCE, tonsillar hypertrophy s/p tonsillectomy, vit D deficiency who presented to the ED on 2/9/2025 due to myalgias and gross hematuria.  Rheumatology was consulted due to concern for possible myositis.       The patient reports she has had a history of recurrent rhabdomyolysis, for approximately 19 years.  She used to have episodes yearly since eighth grade until 2017, then another episode in 2020, and now most recently 2/2025. She reports that every episode in her lifetime has been triggered by some sort of infection, such as strep throat, or flu.  Her typical presentation is approximately 1 week after onset of infection, she starts developing severe body aches and dark urine.  This most recent episode, she reports she began to have URI symptoms including fevers and chills around 1/29-1/30.  She began to feel better from her URI, but started developing body aches approximately 1 week after, and then developed dark urine on 2/9/2025, which prompted her to come in for evaluation.  The patient reports that she does have a lot of muscle pain during these episodes, which can lead to weakness, but between episodes, she does not have any symptoms.     Pertinent work-up this episode:  2/9/25 CK 108k, still >10568  ALT elevated 220 -> 226 -> 275  AST elevated 552 -> 544 -> 629  CMP initially normal Na, now mild hyponatremia 133, normal renal function, initially normal albumin now hypoalbuminemia 3.3  Fibrinogen elevated 452  D-dimer elevated 895  CBC initially normal, now normocytic anemia 11.3 (?dilutional), no cytopenias. 2/11 CBC no diff, but 2/9 CBC with elevated ANC  Hepatitis A, B, C normal  COVID negative  UA with +blood, +protein, no RBC, 1+ protein  UDS cannibinoid +     Previous work-up:  4/2017 rheum  "labs:  Anti Nuclear Antibody Panel (with automatic CARLA Panel)   Mckay Extractable Nuclear Antibody <0.2   Anti Sm/Rnp <0.2   Anti SSA <0.2   Anti SSB <0.2   Antibody to Antiscleroderma-70 <0.2   Antibody to ACACIA-1 <0.2   Anti-Chromatin <0.2   Anti-Centromere <0.2   Antibody Assay, Ribosomal P Protein <0.2  Anti-dsDNA (Double Stranded) Antibodies <1.0   RNP Antibody <0.2   10/21/2009:RIGHT THIGH MUSCLE, BIOPSY: -- SKELETAL MUSCLE, NO HISTOPATHOLOGICAL DIAGNOSIS.   Note: There is no inflammation or vasculitis. Muscle fiber degeneration is not identified. Trichrome staining demonstrates no ragged red fibers. NADH and cytochrome oxidase stains are within normal limits. Oil red O. demonstrates no evidence of lipid storage. Ultrastructural examination shows normal mitochondrial distribution and morphology. There is no increase in lipid or glycogen within the muscle fibers.     The patient has been evaluated by rheumatology in the past, both in 2012 and most recently in 2017.  During both previous rheumatology evaluations, it was thought that recurrent episodes of rhabdomyolysis were not an autoimmune rheumatologic process, especially given previous normal serologies and previous normal muscle biopsy results.  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 Patient was also recommended in 2017 to have genomic sequencing and neuromuscular evaluation.     The patient did see metabolism in the past, with note as below:  \"Previous metabolic work-up has included:    Muscle biopsy 10/21/2009: no inflammation, necrotizing, or vasculitic changes with normal histology and ultrastructure and no evidence of glycogen or lipid accumulation    Normal CPT-I and low normal CPT-II enzyme activity in " "muscle    Normal mitochondrial function (includes completely normal oxidation of amino acids, fatty acids and pyruvate, normal coupling of oxidation and phosphorylation, and normal oxidation of artificial substrates designed to individually interogate complexes I, II, III and IV of the respiratory chain)    Normal acylcarnitine profiles (plasma, urine and skeletal muscle)    Normal urine organic acids    Normal plasma amino acids    Normal plasma lactate/pyruvate profiles    PYGM common mutations panel (for Mcardle disease- GSD V) was negative for a mutation and a AMPD1 common mutations panel (myoadenylate deaminase deficiency) was also negative. These panels only test for the most common mutations and do not rule other mutations.    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\"        ROS:  Constitutional: Denies fever, chills, fatigue  Head: Denies headaches or hair loss  Eyes: Denies dry eyes, blurry vision, redness of the eyes, pain in the eyes or H/O inflammatory eye disease  ENT: Denies dry mouth, loss of taste, sores in the mouth, nose bleed, or difficulty swallowing  Cardiovascular: Denies chest pain, palpitations  Respiratory: Denies shortness of breath or cough or wheezing  Gastrointestinal: Denies nausea, vomiting, heartburn, abdominal pain , diarrhea or blood in the stool  Genitourinary: No urinary urgency, frequency, dysuria   Integumentary: Denies photosensitivity, rash or lesions, Raynaud's or psoriasis  Neurological: Denies any numbness or tingling or weakness  Hematologic/Lymphatic: Denies bleeding, bruising, Raynaud's phenomenon  MSK: As per HPI. No enthesitis, sausage finger, finger tip ulceration, or back pain  Subjective    Subjective   No acute overnight events.  Patient was seen and examined at bedside.  Patient still with some myalgias, IV fluids running.  CK level still greater than 40,000.  No new symptoms or " complaints.      Objective   Objective     Patient Vitals for the past 24 hrs:   BP Temp Temp src Pulse Resp SpO2   02/12/25 0817 107/63 36.5 °C (97.7 °F) -- 80 18 95 %   02/12/25 0347 126/78 36.5 °C (97.7 °F) Temporal 73 16 99 %   02/11/25 2335 132/73 36.5 °C (97.7 °F) Temporal 72 16 97 %   02/11/25 2006 132/73 36.6 °C (97.9 °F) -- 94 16 96 %   02/11/25 1620 140/50 36.5 °C (97.7 °F) -- 80 17 97 %   02/11/25 1140 133/88 36.3 °C (97.3 °F) -- 86 19 96 %        PHYSICAL EXAM:  General - NAD, pleasant, AAOx3  Head: Normocephalic, atraumatic  Eyes - PERRLA, EOMI. No conjunctiva injection.   Mouth/ENT - Moist oral and nasal mucosa. No facial rash.   Cardiovascular - Regular rate and rhythm. No murmurs or rubs.  Lungs - Clear to auscultation bilaterally.   Skin - No rashes or ulcers. No erythema on bilateral cheeks.  Extremities - No edema, cyanosis ,or clubbing  Neurological - Alert and oriented x 3,  grossly intact. No focal deficit. Motor strength 5/5 in all extremities.     Musculoskeletal  Shoulders: Full ROM, without pain, no swelling, warmth or tenderness.  Elbows: Full ROM, without pain, no swelling, warmth or tenderness.  Wrists: Full ROM, without pain, no swelling, warmth or tenderness.  MCP: No swelling, warmth or tenderness. Metacarpal squeeze negative  PIP: No swelling, warmth or tenderness.  DIP: No swelling, warmth or tenderness.  Hips: Full ROM.  No malalignment.  Knees:  Full ROM, without pain, no swelling, warmth or point tenderness. No joint line tenderness, no pes anserine tenderness.  Ankles: Full ROM, without pain, swelling, warmth or tenderness.  Toes: No swelling, warmth or tenderness. Metatarsal squeeze negative        LABS:  Results from last 7 days   Lab Units 02/11/25  0647 02/09/25  0521   WBC AUTO x10*3/uL 5.2 10.3   RBC AUTO x10*6/uL 4.33 4.84   HEMOGLOBIN g/dL 11.3* 12.8   MCV fL 85 81     Results from last 7 days   Lab Units 02/11/25  0647 02/10/25  0716 02/09/25  1802 02/09/25  0521  "  SODIUM mmol/L 133* 132* 135* 136   POTASSIUM mmol/L 4.5 4.4 4.1 4.1   CHLORIDE mmol/L 100 100 101 98   CO2 mmol/L 26 27 29 26   BUN mg/dL 6 8 9 14   CREATININE mg/dL 0.42* 0.39* 0.43* 0.49*   EGFR mL/min/1.73m*2 >90 >90 >90 >90   CALCIUM mg/dL 9.0 8.7 8.4* 9.7   PHOSPHORUS mg/dL  --  3.3 3.7 4.0   MAGNESIUM mg/dL  --  1.88 1.94 2.08   ALBUMIN g/dL 3.3* 3.2* 3.3* 4.2   BILIRUBIN TOTAL mg/dL 0.3 0.4  --  0.4   ALT U/L 275* 226*  --  220*   AST U/L 629* 544*  --  552*   ALK PHOS U/L 47 46  --  63   PROTEIN TOTAL g/dL 6.4 6.1*  --  8.1     Lab Results   Component Value Date    CRP 10.17 (H) 02/11/2025     Lab Results   Component Value Date    SEDRATE 61 (H) 02/12/2025     No results found for: \"C3\", \"C4\"  No results found for: \"RF\", \"CITAB\"  Lab Results   Component Value Date    ARNP <0.2 02/09/2025    ASMRN <0.2 02/09/2025    ASSA <0.2 02/09/2025    ASSB <0.2 02/09/2025    ASCL <0.2 02/09/2025    JO1 <0.2 02/09/2025    ACHR <0.2 02/09/2025    ACEN <0.2 02/09/2025    DNADS <1.0 02/09/2025     Lab Results   Component Value Date    URICACID 3.8 02/02/2018       No results found for: \"COLORFL\", \"CLARITYFLUID\", \"WBCFL\", \"NEUTROBFREL\", \"LYMPHSBFREL\", \"EOSBFREL\", \"BASOBFREL\", \"PLASMACFLD\", \"RBCFL\", \"CRYSFL\"    IMAGING:  === 04/04/22 ===    CHEST 2 VIEW    - Impression -  Mild right basilar atelectasis without focal consolidation. No  evidence of acute cardiopulmonary process.    I personally reviewed the images/study and I agree with the findings  as stated. This study was interpreted at [facility], [place], [place].         No results found.     Assessment    Assessment & Plan   Ms. Niya Au is a 34 y.o. female with a past medical history of recurrent rhabdomyolysis, obesity, DULCE, tonsillar hypertrophy s/p tonsillectomy, vit D deficiency who presented to the ED on 2/9/2025 due to myalgias and gross hematuria.  Rheumatology was consulted due to concern for possible myositis.     "   Pertinent work-up this episode:  2/9/25 CK 108k, still >51778 2/12  ALT elevated 220 -> 226 -> 275  AST elevated 552 -> 544 -> 629  CMP initially normal Na, now mild hyponatremia 133, normal renal function, initially normal albumin now hypoalbuminemia 3.3  Fibrinogen elevated 452  D-dimer elevated 895  CBC initially normal, now normocytic anemia 11.3 (?dilutional), no cytopenias. 2/11 CBC no diff, but 2/9 CBC with elevated ANC  Hepatitis A, B, C normal  COVID negative  UA with +blood, +protein, no RBC, 1+ protein  UDS cannibinoid +  Vitamin D low 8  GABRIEL positive 1:640 nuclear dots  CARLA negative  ESR elevated 61  CRP elevated 10.17     Previous work-up:  2012 GABRIEL - negative  4/2017 rheum labs:  Anti Nuclear Antibody Panel (with automatic CARLA Panel) - negative  Mckay Extractable Nuclear Antibody <0.2   Anti Sm/Rnp <0.2   Anti SSA <0.2   Anti SSB <0.2   Antibody to Antiscleroderma-70 <0.2   Antibody to ACACIA-1 <0.2   Anti-Chromatin <0.2   Anti-Centromere <0.2   Antibody Assay, Ribosomal P Protein <0.2  Anti-dsDNA (Double Stranded) Antibodies <1.0   RNP Antibody <0.2   10/21/2009:RIGHT THIGH MUSCLE, BIOPSY: -- SKELETAL MUSCLE, NO HISTOPATHOLOGICAL DIAGNOSIS.   Note: There is no inflammation or vasculitis. Muscle fiber degeneration is not identified. Trichrome staining demonstrates no ragged red fibers. NADH and cytochrome oxidase stains are within normal limits. Oil red O. demonstrates no evidence of lipid storage. Ultrastructural examination shows normal mitochondrial distribution and morphology. There is no increase in lipid or glycogen within the muscle fibers.     Previous metabolic work-up -   Muscle biopsy 10/21/2009: no inflammation, necrotizing, or vasculitic changes with normal histology and ultrastructure and no evidence of glycogen or lipid accumulation    Normal CPT-I and low normal CPT-II enzyme activity in muscle    Normal mitochondrial function (includes completely normal oxidation of amino acids, fatty  acids and pyruvate, normal coupling of oxidation and phosphorylation, and normal oxidation of artificial substrates designed to individually interogate complexes I, II, III and IV of the respiratory chain)    Normal acylcarnitine profiles (plasma, urine and skeletal muscle)    Normal urine organic acids    Normal plasma amino acids    Normal plasma lactate/pyruvate profiles    PYGM common mutations panel (for Mcardle disease- GSD V) was negative for a mutation and a AMPD1 common mutations panel (myoadenylate deaminase deficiency) was also negative. These panels only test for the most common mutations and do not rule other mutations.    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  However, etiology of recurrent episodes of rhabdomyolysis is still not clear.  Agree with further genetic/metabolic workup.    Inflammatory markers elevated but nonspecific, could be related to ongoing rhabdomyolysis. Noted that patient has positive GABRIEL 1:640 nuclear dots pattern, which is a new finding from 2012 and 2017 labs.  However, CARLA panel remains negative, making rheumatologic etiology of new elevated GABRIEL less likely.  Nuclear dots pattern is nonspecific, and is not typically seen in rheumatologic diseases, but can sometimes be seen in primary biliary cholangitis or autoimmune hepatitis. Can consider further workup if deemed appropriate per primary team. Will also continue to follow extended myositis panel results.     Acute episode of recurrent rhabdomyolysis, likely 2/2 recent URI  GABRIEL positive 1:640 nuclear dots, unclear clinical  significance  Normocytic anemia  Transaminitis 2/2 rhabdomyolysis  Severe vitamin D deficiency    Recommendations:  Will follow extended myositis panel results  Trend CK and CMP daily  Continue IV hydration and pain control as per primary team  Recommend starting vitamin D supplementation  Patient would benefit from outpatient genetics/metabolism follow-up on discharge (re-establish care)    The rest per the primary team.     Thank you for this interesting consult. Rheumatology will continue to follow.      Independently reviewed three or more labs  Images including CXR, CT, MRI independently reviewed.   Monitoring for drug toxicity   Discussed with patient and primary team     Patient case discussed with Dr. Arriaga.    Nissa Navarro MD  Rheumatology Fellow, PGY-4

## 2025-02-12 NOTE — PROGRESS NOTES
02/12/25 1253   Discharge Planning   Living Arrangements Children   Support Systems Parent   Assistance Needed none at this time   Type of Residence Private residence   Number of Stairs to Enter Residence 4   Number of Stairs Within Residence 12   Do you have animals or pets at home? No   Who is requesting discharge planning? Provider   Home or Post Acute Services None   Expected Discharge Disposition Home   Does the patient need discharge transport arranged? No  (family will tranpsort home)   RoundTrip coordination needed? No   Has discharge transport been arranged? No   Financial Resource Strain   How hard is it for you to pay for the very basics like food, housing, medical care, and heating? Not hard   Housing Stability   In the last 12 months, was there a time when you were not able to pay the mortgage or rent on time? N   In the past 12 months, how many times have you moved where you were living? 0   At any time in the past 12 months, were you homeless or living in a shelter (including now)? N   Transportation Needs   In the past 12 months, has lack of transportation kept you from medical appointments or from getting medications? no   In the past 12 months, has lack of transportation kept you from meetings, work, or from getting things needed for daily living? No     Assessment Note:  Met with pt introduced myself as  and member of the Care Transitions team for discharge planning.   Pt feels safe at home with her children.  Pt stated she was independent prior to admission.  Pt [place] to [address].  Pt's address, phone number and contact information was verified. Pt does not have any other questions/concerns at this time.     Previous Home Care: none  DME: none  Ph[facility]acy: [facility] on [address]  Falls: no recent falls  Transport home: family will transport home  PCP:  Pt stated she recently switched to her employer's insurance and will need to get a new PCP through her new insurance.  Pt is independent and  should have no needs upon discharge.  Care transitions will continue to be available if discharge needs arise.  Charlette CRUZ, RAQUEL-S  ([id number])

## 2025-02-12 NOTE — CONSULTS
"Nutrition Note:   Nutrition Assessment    Reason for Assessment: Admission nursing screening  Malnutrition Screening Tool (MST)  Have you recently lost weight without trying?: Unsure  If yes, how much weight have you lost?: Unsure  Weight Loss Score: 2  Have you been eating poorly because of a decreased appetite?: No  Malnutrition Score: 2    Patient is a 34 y.o. female presenting with Non-traumatic rhabdomyolysis.  PMH of recurrent rhabdomyolysis (last admission reported as Jan 2020)     Anthropometrics:  Height: 160 cm (5' 3\")   Weight: 102 kg (224 lb)   BMI (Calculated): 39.69  IBW/kg (Dietitian Calculated): 52.3 kg  Percent of IBW: 195 %  Adjusted Body Weight (kg): 65 kg    Weight History:   Wt Readings from Last 101 Encounters:   02/09/25 102 kg (224 lb)   10/26/23 110 kg (242 lb 14.4 oz)   08/03/22 106 kg (233 lb 9.3 oz)   08/06/20 106 kg (233 lb)   02/02/18 107 kg (234 lb 12.6 oz)     Nutrition Significant Labs:  CBC Trend:   Results from last 7 days   Lab Units 02/11/25  0647 02/09/25  0521   WBC AUTO x10*3/uL 5.2 10.3   RBC AUTO x10*6/uL 4.33 4.84   HEMOGLOBIN g/dL 11.3* 12.8   HEMATOCRIT % 36.8 39.4   MCV fL 85 81   PLATELETS AUTO x10*3/uL 338 415    , Renal Lab Trend:   Results from last 7 days   Lab Units 02/12/25  0709 02/11/25  0647 02/10/25  0716 02/09/25  1802   POTASSIUM mmol/L 4.2 4.5 4.4 4.1   PHOSPHORUS mg/dL  --   --  3.3 3.7   SODIUM mmol/L 135* 133* 132* 135*   MAGNESIUM mg/dL  --   --  1.88 1.94   EGFR mL/min/1.73m*2 >90 >90 >90 >90   BUN mg/dL 6 6 8 9   CREATININE mg/dL 0.37* 0.42* 0.39* 0.43*    , Vit D:   Lab Results   Component Value Date    VITD25 8 (L) 02/09/2025        Nutrition Specific Medications:  Scheduled medications  acetaminophen, 975 mg, oral, TID  enoxaparin, 40 mg, subcutaneous, q24h  ergocalciferol, 1,250 mcg, oral, Weekly  methocarbamol, 500 mg, oral, q8h ISAI  polyethylene glycol, 17 g, oral, BID  sennosides-docusate sodium, 2 tablet, oral, BID      Dietary Orders (From " admission, onward)       Start     Ordered    02/09/25 1847  May Participate in Room Service  ( ROOM SERVICE MAY PARTICIPATE)  Once        Question:  .  Answer:  Yes    02/09/25 1846 02/09/25 1046  Adult diet Regular  Diet effective now        Question:  Diet type  Answer:  Regular    02/09/25 1046                     Nutrition Interventions/Recommendations        Nutrition Recommendations:  Individualized Nutrition Prescription Provided for : 1) Continue Regular diet and Vitamin D repletion.        Time Spent (min): 20 minutes (chart review)

## 2025-02-13 LAB
ALBUMIN SERPL BCP-MCNC: 3.1 G/DL (ref 3.4–5)
ALP SERPL-CCNC: 48 U/L (ref 33–110)
ALT SERPL W P-5'-P-CCNC: 287 U/L (ref 7–45)
ANION GAP SERPL CALC-SCNC: 11 MMOL/L (ref 10–20)
AST SERPL W P-5'-P-CCNC: 381 U/L (ref 9–39)
BILIRUB SERPL-MCNC: 0.2 MG/DL (ref 0–1.2)
BUN SERPL-MCNC: 7 MG/DL (ref 6–23)
CALCIUM SERPL-MCNC: 8.7 MG/DL (ref 8.6–10.6)
CHLORIDE SERPL-SCNC: 103 MMOL/L (ref 98–107)
CK SERPL-CCNC: 9620 U/L (ref 0–215)
CK SERPL-CCNC: ABNORMAL U/L (ref 0–215)
CO2 SERPL-SCNC: 27 MMOL/L (ref 21–32)
CREAT SERPL-MCNC: 0.4 MG/DL (ref 0.5–1.05)
EGFRCR SERPLBLD CKD-EPI 2021: >90 ML/MIN/1.73M*2
GLUCOSE SERPL-MCNC: 82 MG/DL (ref 74–99)
POTASSIUM SERPL-SCNC: 4.1 MMOL/L (ref 3.5–5.3)
PROT SERPL-MCNC: 6.3 G/DL (ref 6.4–8.2)
SODIUM SERPL-SCNC: 137 MMOL/L (ref 136–145)

## 2025-02-13 PROCEDURE — 36415 COLL VENOUS BLD VENIPUNCTURE: CPT | Performed by: INTERNAL MEDICINE

## 2025-02-13 PROCEDURE — 2500000001 HC RX 250 WO HCPCS SELF ADMINISTERED DRUGS (ALT 637 FOR MEDICARE OP)

## 2025-02-13 PROCEDURE — 1210000001 HC SEMI-PRIVATE ROOM DAILY

## 2025-02-13 PROCEDURE — 82550 ASSAY OF CK (CPK): CPT | Performed by: INTERNAL MEDICINE

## 2025-02-13 PROCEDURE — 2500000004 HC RX 250 GENERAL PHARMACY W/ HCPCS (ALT 636 FOR OP/ED)

## 2025-02-13 PROCEDURE — 99232 SBSQ HOSP IP/OBS MODERATE 35: CPT | Performed by: INTERNAL MEDICINE

## 2025-02-13 PROCEDURE — 2500000001 HC RX 250 WO HCPCS SELF ADMINISTERED DRUGS (ALT 637 FOR MEDICARE OP): Performed by: INTERNAL MEDICINE

## 2025-02-13 PROCEDURE — 80053 COMPREHEN METABOLIC PANEL: CPT | Performed by: INTERNAL MEDICINE

## 2025-02-13 PROCEDURE — 2500000004 HC RX 250 GENERAL PHARMACY W/ HCPCS (ALT 636 FOR OP/ED): Performed by: INTERNAL MEDICINE

## 2025-02-13 RX ORDER — OXYCODONE HYDROCHLORIDE 5 MG/1
5 TABLET ORAL EVERY 6 HOURS PRN
Status: DISCONTINUED | OUTPATIENT
Start: 2025-02-13 | End: 2025-02-14 | Stop reason: HOSPADM

## 2025-02-13 RX ORDER — OXYCODONE HYDROCHLORIDE 5 MG/1
10 TABLET ORAL EVERY 6 HOURS PRN
Status: DISCONTINUED | OUTPATIENT
Start: 2025-02-13 | End: 2025-02-14 | Stop reason: HOSPADM

## 2025-02-13 RX ORDER — SODIUM CHLORIDE 9 MG/ML
200 INJECTION, SOLUTION INTRAVENOUS CONTINUOUS
Status: ACTIVE | OUTPATIENT
Start: 2025-02-13 | End: 2025-02-14

## 2025-02-13 RX ADMIN — SODIUM CHLORIDE 200 ML/HR: 9 INJECTION, SOLUTION INTRAVENOUS at 06:10

## 2025-02-13 RX ADMIN — ACETAMINOPHEN 975 MG: 325 TABLET ORAL at 09:15

## 2025-02-13 RX ADMIN — HYDROMORPHONE HYDROCHLORIDE 0.2 MG: 1 INJECTION, SOLUTION INTRAMUSCULAR; INTRAVENOUS; SUBCUTANEOUS at 01:29

## 2025-02-13 RX ADMIN — ENOXAPARIN SODIUM 40 MG: 100 INJECTION SUBCUTANEOUS at 20:19

## 2025-02-13 RX ADMIN — OXYCODONE 10 MG: 5 TABLET ORAL at 14:17

## 2025-02-13 RX ADMIN — ACETAMINOPHEN 975 MG: 325 TABLET ORAL at 20:19

## 2025-02-13 RX ADMIN — METHOCARBAMOL 500 MG: 500 TABLET ORAL at 06:09

## 2025-02-13 RX ADMIN — SODIUM CHLORIDE 200 ML/HR: 9 INJECTION, SOLUTION INTRAVENOUS at 11:45

## 2025-02-13 RX ADMIN — SODIUM CHLORIDE 200 ML/HR: 9 INJECTION, SOLUTION INTRAVENOUS at 18:29

## 2025-02-13 RX ADMIN — HYDROMORPHONE HYDROCHLORIDE 0.2 MG: 1 INJECTION, SOLUTION INTRAMUSCULAR; INTRAVENOUS; SUBCUTANEOUS at 21:55

## 2025-02-13 RX ADMIN — OXYCODONE 10 MG: 5 TABLET ORAL at 18:27

## 2025-02-13 RX ADMIN — OXYCODONE 10 MG: 5 TABLET ORAL at 09:27

## 2025-02-13 RX ADMIN — METHOCARBAMOL 500 MG: 500 TABLET ORAL at 16:50

## 2025-02-13 RX ADMIN — ACETAMINOPHEN 975 MG: 325 TABLET ORAL at 16:50

## 2025-02-13 ASSESSMENT — PAIN DESCRIPTION - LOCATION
LOCATION: BACK
LOCATION: GENERALIZED

## 2025-02-13 ASSESSMENT — PAIN SCALES - GENERAL
PAINLEVEL_OUTOF10: 10 - WORST POSSIBLE PAIN
PAINLEVEL_OUTOF10: 7
PAINLEVEL_OUTOF10: 9
PAINLEVEL_OUTOF10: 5 - MODERATE PAIN
PAINLEVEL_OUTOF10: 9

## 2025-02-13 ASSESSMENT — PAIN - FUNCTIONAL ASSESSMENT
PAIN_FUNCTIONAL_ASSESSMENT: 0-10

## 2025-02-13 NOTE — PROGRESS NOTES
Niya Au is a 34 y.o. female on day 4 of admission presenting with Non-traumatic rhabdomyolysis.      Subjective   No events over night.     Reports continued improvement in generalized muscle pains.    No SOB.        Objective     Last Recorded Vitals  /63   Pulse 79   Temp 36.6 °C (97.9 °F)   Resp 20   Wt 102 kg (224 lb)   SpO2 97%   Intake/Output last 3 Shifts:    Intake/Output Summary (Last 24 hours) at 2/13/2025 1106  Last data filed at 2/13/2025 0900  Gross per 24 hour   Intake 8042.67 ml   Output 1 ml   Net 8041.67 ml       Admission Weight  Weight: 102 kg (224 lb) (02/09/25 0338)    Daily Weight  02/09/25 : 102 kg (224 lb)    Image Results  ECG 12 lead  Sinus tachycardia  Biatrial enlargement  Rightward axis  T wave abnormality, consider inferior ischemia  Abnormal ECG  When compared with ECG of 09-FEB-2025 03:41,  Sinus rhythm has replaced Atrial flutter  ST no longer elevated in Inferior leads  Nonspecific T wave abnormality now evident in Anterolateral leads    See ED provider note for full interpretation and clinical correlation  Confirmed by Fiona Rodriguez ([id number]) on 2/9/2025 4:48:56 AM    Vitals:    02/13/25 0827   BP: 131/63   Pulse: 79   Resp: 20   Temp: 36.6 °C (97.9 °F)   SpO2: 97%       Physical Exam  Constitutional:       Appearance: She is obese.   HENT:      Head: Normocephalic.      Nose: Nose normal.      Mouth/Throat:      Mouth: Mucous membranes are moist.   Eyes:      Extraocular Movements: Extraocular movements intact.      Pupils: Pupils are equal, round, and reactive to light.   Cardiovascular:      Rate and Rhythm: Normal rate and regular rhythm.      Heart sounds: Normal heart sounds. No murmur heard.  Pulmonary:      Effort: No respiratory distress.      Breath sounds: Normal breath sounds. No wheezing.   Abdominal:      General: There is no distension.      Palpations: Abdomen is soft.      Tenderness: There is no abdominal tenderness.   Musculoskeletal:          General: Normal range of motion.      Cervical back: Normal range of motion.   Skin:     General: Skin is warm.   Neurological:      General: No focal deficit present.      Mental Status: She is alert and oriented to person, place, and time.   Psychiatric:         Mood and Affect: Mood normal.         Relevant Results  Scheduled medications  acetaminophen, 975 mg, oral, TID  enoxaparin, 40 mg, subcutaneous, q24h  ergocalciferol, 1,250 mcg, oral, Weekly  methocarbamol, 500 mg, oral, q8h ISAI  polyethylene glycol, 17 g, oral, BID  sennosides-docusate sodium, 2 tablet, oral, BID      Continuous medications  sodium chloride 0.9%, 200 mL/hr      PRN medications  PRN medications: HYDROmorphone, oxyCODONE, oxyCODONE    Results for orders placed or performed during the hospital encounter of 02/09/25 (from the past 24 hours)   Creatine Kinase   Result Value Ref Range    Creatine Kinase 9,620 (H) 0 - 215 U/L   Comprehensive Metabolic Panel   Result Value Ref Range    Glucose 82 74 - 99 mg/dL    Sodium 137 136 - 145 mmol/L    Potassium 4.1 3.5 - 5.3 mmol/L    Chloride 103 98 - 107 mmol/L    Bicarbonate 27 21 - 32 mmol/L    Anion Gap 11 10 - 20 mmol/L    Urea Nitrogen 7 6 - 23 mg/dL    Creatinine 0.40 (L) 0.50 - 1.05 mg/dL    eGFR >90 >60 mL/min/1.73m*2    Calcium 8.7 8.6 - 10.6 mg/dL    Albumin 3.1 (L) 3.4 - 5.0 g/dL    Alkaline Phosphatase 48 33 - 110 U/L    Total Protein 6.3 (L) 6.4 - 8.2 g/dL     (H) 9 - 39 U/L    Bilirubin, Total 0.2 0.0 - 1.2 mg/dL     (H) 7 - 45 U/L         Assessment/Plan      Ms. Au is a 34 year old F with PMH of recurrent rhabdomyolysis ( last admission reported as Jan 2020) who is presented with generalized muscle pains since last Friday with CK >100K c/w rhabdomyolysis. Pt notes that she has had viral URI sx last week with cough, pleuritic CP and rhinorrhea. COVID/flu/RSV tests are negative. Per chart review, patient has had an extensive metabolic workup in the past as well as  "muscle bx that was unrevealing. 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   Currently CK is > 40 K and she is on IV fluids.      Rhabdomyolysis in the setting of recent viral URI  Elevated transaminases  Recurrent rhabdo   - TSH 0.10, free T4 0.93   - s/p 2L LR in the ED. C/w NS at 200cc/hr until CK <5K  - Strict I/Os, goal -300cc/h, diuresis PRN for s/s V overload   - monitor CK, RFP q12h   - myositis panel,   UDS positive for Marijuana,   - hepatitis panel negative,   - pain regimen: tylenol 975mg TID, oxy 5mg q4h PRN, oxy 10mg q4h PRN, IV dilaudid 0.2mg q3h PRN for breakthrough   Continue Methocarbamol 500 mg Q 8hrs.   - bowel reg   Cont. IVF @200cc/hr   CK improved to 9000,   Genetics, recommended metabolism labs and follow-up as an outpatient (their office will call the patient)  Rheumatology is consulted, recs appreciated.            F: Cont. NS 200cc/h  E: PRN, RFP q12h   N: Regular  A: PIV  DVT ppx: S/c lovenox  Code Status: Full Code  NOK: júnior Fortune [phone]     Dispo: Improvement in muscle symptoms.        Ren Banuelos MD      "

## 2025-02-13 NOTE — PROGRESS NOTES
Rheumatology Progress Note   Reason for Consult:  Suspected myositis     History of Presenting Illness  Ms. Niya Au is a 34 y.o. female with a past medical history of recurrent rhabdomyolysis, obesity, DULCE, tonsillar hypertrophy s/p tonsillectomy, vit D deficiency who presented to the ED on 2/9/2025 due to myalgias and gross hematuria.  Rheumatology was consulted due to concern for possible myositis.       The patient reports she has had a history of recurrent rhabdomyolysis, for approximately 19 years.  She used to have episodes yearly since eighth grade until 2017, then another episode in 2020, and now most recently 2/2025. She reports that every episode in her lifetime has been triggered by some sort of infection, such as strep throat, or flu.  Her typical presentation is approximately 1 week after onset of infection, she starts developing severe body aches and dark urine.  This most recent episode, she reports she began to have URI symptoms including fevers and chills around 1/29-1/30.  She began to feel better from her URI, but started developing body aches approximately 1 week after, and then developed dark urine on 2/9/2025, which prompted her to come in for evaluation.  The patient reports that she does have a lot of muscle pain during these episodes, which can lead to weakness, but between episodes, she does not have any symptoms.     Pertinent work-up this episode:  2/9/25 CK 108k, still >66550  ALT elevated 220 -> 226 -> 275  AST elevated 552 -> 544 -> 629  CMP initially normal Na, now mild hyponatremia 133, normal renal function, initially normal albumin now hypoalbuminemia 3.3  Fibrinogen elevated 452  D-dimer elevated 895  CBC initially normal, now normocytic anemia 11.3 (?dilutional), no cytopenias. 2/11 CBC no diff, but 2/9 CBC with elevated ANC  Hepatitis A, B, C normal  COVID negative  UA with +blood, +protein, no RBC, 1+ protein  UDS cannibinoid +     Previous work-up:  4/2017 rheum  "labs:  Anti Nuclear Antibody Panel (with automatic CARLA Panel)   Mckay Extractable Nuclear Antibody <0.2   Anti Sm/Rnp <0.2   Anti SSA <0.2   Anti SSB <0.2   Antibody to Antiscleroderma-70 <0.2   Antibody to ACACIA-1 <0.2   Anti-Chromatin <0.2   Anti-Centromere <0.2   Antibody Assay, Ribosomal P Protein <0.2  Anti-dsDNA (Double Stranded) Antibodies <1.0   RNP Antibody <0.2   10/21/2009:RIGHT THIGH MUSCLE, BIOPSY: -- SKELETAL MUSCLE, NO HISTOPATHOLOGICAL DIAGNOSIS.   Note: There is no inflammation or vasculitis. Muscle fiber degeneration is not identified. Trichrome staining demonstrates no ragged red fibers. NADH and cytochrome oxidase stains are within normal limits. Oil red O. demonstrates no evidence of lipid storage. Ultrastructural examination shows normal mitochondrial distribution and morphology. There is no increase in lipid or glycogen within the muscle fibers.     The patient has been evaluated by rheumatology in the past, both in 2012 and most recently in 2017.  During both previous rheumatology evaluations, it was thought that recurrent episodes of rhabdomyolysis were not an autoimmune rheumatologic process, especially given previous normal serologies and previous normal muscle biopsy results.  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 Patient was also recommended in 2017 to have genomic sequencing and neuromuscular evaluation.     The patient did see metabolism in the past, with note as below:  \"Previous metabolic work-up has included:    Muscle biopsy 10/21/2009: no inflammation, necrotizing, or vasculitic changes with normal histology and ultrastructure and no evidence of glycogen or lipid accumulation    Normal CPT-I and low normal CPT-II enzyme activity in " "muscle    Normal mitochondrial function (includes completely normal oxidation of amino acids, fatty acids and pyruvate, normal coupling of oxidation and phosphorylation, and normal oxidation of artificial substrates designed to individually interogate complexes I, II, III and IV of the respiratory chain)    Normal acylcarnitine profiles (plasma, urine and skeletal muscle)    Normal urine organic acids    Normal plasma amino acids    Normal plasma lactate/pyruvate profiles    PYGM common mutations panel (for Mcardle disease- GSD V) was negative for a mutation and a AMPD1 common mutations panel (myoadenylate deaminase deficiency) was also negative. These panels only test for the most common mutations and do not rule other mutations.    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\"        ROS:  Constitutional: Denies fever, chills, fatigue  Head: Denies headaches or hair loss  Eyes: Denies dry eyes, blurry vision, redness of the eyes, pain in the eyes or H/O inflammatory eye disease  ENT: Denies dry mouth, loss of taste, sores in the mouth, nose bleed, or difficulty swallowing  Cardiovascular: Denies chest pain, palpitations  Respiratory: Denies shortness of breath or cough or wheezing  Gastrointestinal: Denies nausea, vomiting, heartburn, abdominal pain , diarrhea or blood in the stool  Genitourinary: No urinary urgency, frequency, dysuria   Integumentary: Denies photosensitivity, rash or lesions, Raynaud's or psoriasis  Neurological: Denies any numbness or tingling or weakness  Hematologic/Lymphatic: Denies bleeding, bruising, Raynaud's phenomenon  MSK: As per HPI. No enthesitis, sausage finger, finger tip ulceration, or back pain  Subjective    Subjective   No acute overnight events.  Patient was seen and examined at bedside.  Patient still with some myalgias, IV fluids running.  CK level improved significantly, now 9600.      Objective "   Objective     Patient Vitals for the past 24 hrs:   BP Temp Pulse Resp SpO2   02/13/25 0827 131/63 36.6 °C (97.9 °F) 79 20 97 %   02/13/25 0434 128/78 36.4 °C (97.5 °F) 76 -- 95 %   02/12/25 2358 141/75 36.7 °C (98.1 °F) 76 -- 98 %   02/12/25 1539 131/86 -- 76 -- 96 %   02/12/25 1154 126/61 36.4 °C (97.5 °F) 85 19 97 %        PHYSICAL EXAM:  General - NAD, pleasant, AAOx3  Head: Normocephalic, atraumatic  Eyes - PERRLA, EOMI. No conjunctiva injection.   Mouth/ENT - Moist oral and nasal mucosa. No facial rash.   Cardiovascular - Regular rate and rhythm. No murmurs or rubs.  Lungs - Clear to auscultation bilaterally.   Skin - No rashes or ulcers. No erythema on bilateral cheeks.  Extremities - No edema, cyanosis ,or clubbing  Neurological - Alert and oriented x 3,  grossly intact. No focal deficit. Motor strength 5/5 in all extremities.     Musculoskeletal  Shoulders: Full ROM, without pain, no swelling, warmth or tenderness.  Elbows: Full ROM, without pain, no swelling, warmth or tenderness.  Wrists: Full ROM, without pain, no swelling, warmth or tenderness.  MCP: No swelling, warmth or tenderness. Metacarpal squeeze negative  PIP: No swelling, warmth or tenderness.  DIP: No swelling, warmth or tenderness.  Hips: Full ROM.  No malalignment.  Knees:  Full ROM, without pain, no swelling, warmth or point tenderness. No joint line tenderness, no pes anserine tenderness.  Ankles: Full ROM, without pain, swelling, warmth or tenderness.  Toes: No swelling, warmth or tenderness. Metatarsal squeeze negative        LABS:  Results from last 7 days   Lab Units 02/11/25  0647 02/09/25  0521   WBC AUTO x10*3/uL 5.2 10.3   RBC AUTO x10*6/uL 4.33 4.84   HEMOGLOBIN g/dL 11.3* 12.8   MCV fL 85 81     Results from last 7 days   Lab Units 02/13/25  0820 02/12/25  0709 02/11/25  0647 02/10/25  0716 02/09/25  1802 02/09/25  0521   SODIUM mmol/L 137 135* 133* 132* 135* 136   POTASSIUM mmol/L 4.1 4.2 4.5 4.4 4.1 4.1   CHLORIDE mmol/L 103  "101 100 100 101 98   CO2 mmol/L 27 27 26 27 29 26   BUN mg/dL 7 6 6 8 9 14   CREATININE mg/dL 0.40* 0.37* 0.42* 0.39* 0.43* 0.49*   EGFR mL/min/1.73m*2 >90 >90 >90 >90 >90 >90   CALCIUM mg/dL 8.7 8.7 9.0 8.7 8.4* 9.7   PHOSPHORUS mg/dL  --   --   --  3.3 3.7 4.0   MAGNESIUM mg/dL  --   --   --  1.88 1.94 2.08   ALBUMIN g/dL 3.1* 3.2* 3.3* 3.2* 3.3* 4.2   BILIRUBIN TOTAL mg/dL 0.2 0.2 0.3 0.4  --  0.4   ALT U/L 287* 291* 275* 226*  --  220*   AST U/L 381* 538* 629* 544*  --  552*   ALK PHOS U/L 48 48 47 46  --  63   PROTEIN TOTAL g/dL 6.3* 6.1* 6.4 6.1*  --  8.1     Lab Results   Component Value Date    CRP 10.17 (H) 02/11/2025     Lab Results   Component Value Date    SEDRATE 61 (H) 02/12/2025     No results found for: \"C3\", \"C4\"  No results found for: \"RF\", \"CITAB\"  Lab Results   Component Value Date    ANATITER 1:640 02/09/2025    ARNP <0.2 02/09/2025    ASMRN <0.2 02/09/2025    ASSA <0.2 02/09/2025    ASSB <0.2 02/09/2025    ASCL <0.2 02/09/2025    JO1 <0.2 02/09/2025    ACHR <0.2 02/09/2025    ACEN <0.2 02/09/2025    DNADS <1.0 02/09/2025     Lab Results   Component Value Date    URICACID 3.8 02/02/2018       No results found for: \"COLORFL\", \"CLARITYFLUID\", \"WBCFL\", \"NEUTROBFREL\", \"LYMPHSBFREL\", \"EOSBFREL\", \"BASOBFREL\", \"PLASMACFLD\", \"RBCFL\", \"CRYSFL\"    IMAGING:  === 04/04/22 ===    CHEST 2 VIEW    - Impression -  Mild right basilar atelectasis without focal consolidation. No  evidence of acute cardiopulmonary process.    I personally reviewed the images/study and I agree with the findings  as stated. This study was interpreted at [facility], [place], [place].         No results found.     Assessment    Assessment & Plan   Ms. Niya Au is a 34 y.o. female with a past medical history of recurrent rhabdomyolysis, obesity, DULCE, tonsillar hypertrophy s/p tonsillectomy, vit D deficiency who presented to the ED on 2/9/2025 due to myalgias and gross hematuria.  Rheumatology " was consulted due to concern for possible myositis.       Pertinent work-up this episode:  2/9/25 CK 108k, still >69431 2/12, now 9600 2/13  ALT elevated 220 -> 226 -> 275  AST elevated 552 -> 544 -> 629  CMP initially normal Na, now mild hyponatremia 133, normal renal function, initially normal albumin now hypoalbuminemia 3.3  Fibrinogen elevated 452  D-dimer elevated 895  CBC initially normal, now normocytic anemia 11.3 (?dilutional), no cytopenias. 2/11 CBC no diff, but 2/9 CBC with elevated ANC  Hepatitis A, B, C normal  COVID negative  UA with +blood, +protein, no RBC, 1+ protein  UDS cannibinoid +  Vitamin D low 8  GABRIEL positive 1:640 nuclear dots  CARLA negative  ESR elevated 61  CRP elevated 10.17     Previous work-up:  2012 GABRIEL - negative  4/2017 rheum labs:  Anti Nuclear Antibody Panel (with automatic CARLA Panel) - negative  Mckay Extractable Nuclear Antibody <0.2   Anti Sm/Rnp <0.2   Anti SSA <0.2   Anti SSB <0.2   Antibody to Antiscleroderma-70 <0.2   Antibody to ACACIA-1 <0.2   Anti-Chromatin <0.2   Anti-Centromere <0.2   Antibody Assay, Ribosomal P Protein <0.2  Anti-dsDNA (Double Stranded) Antibodies <1.0   RNP Antibody <0.2   10/21/2009:RIGHT THIGH MUSCLE, BIOPSY: -- SKELETAL MUSCLE, NO HISTOPATHOLOGICAL DIAGNOSIS.   Note: There is no inflammation or vasculitis. Muscle fiber degeneration is not identified. Trichrome staining demonstrates no ragged red fibers. NADH and cytochrome oxidase stains are within normal limits. Oil red O. demonstrates no evidence of lipid storage. Ultrastructural examination shows normal mitochondrial distribution and morphology. There is no increase in lipid or glycogen within the muscle fibers.     Previous metabolic work-up -   Muscle biopsy 10/21/2009: no inflammation, necrotizing, or vasculitic changes with normal histology and ultrastructure and no evidence of glycogen or lipid accumulation    Normal CPT-I and low normal CPT-II enzyme activity in muscle    Normal mitochondrial  function (includes completely normal oxidation of amino acids, fatty acids and pyruvate, normal coupling of oxidation and phosphorylation, and normal oxidation of artificial substrates designed to individually interogate complexes I, II, III and IV of the respiratory chain)    Normal acylcarnitine profiles (plasma, urine and skeletal muscle)    Normal urine organic acids    Normal plasma amino acids    Normal plasma lactate/pyruvate profiles    PYGM common mutations panel (for Mcardle disease- GSD V) was negative for a mutation and a AMPD1 common mutations panel (myoadenylate deaminase deficiency) was also negative. These panels only test for the most common mutations and do not rule other mutations.    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  However, etiology of recurrent episodes of rhabdomyolysis is still not clear.  Agree with further genetic/metabolic workup.    Inflammatory markers elevated but nonspecific, could be related to ongoing rhabdomyolysis. Noted that patient has positive GABRIEL 1:640 nuclear dots pattern, which is a new finding from 2012 and 2017 labs.  However, CARLA panel remains negative, making rheumatologic etiology of new elevated GABRIEL less likely.  Nuclear dots pattern is nonspecific, and is not typically seen in rheumatologic diseases, but can sometimes be seen in primary biliary cholangitis or autoimmune hepatitis. Can consider further workup if deemed appropriate per primary team. Will also continue to follow extended myositis panel results.     Acute episode of recurrent rhabdomyolysis, likely 2/2  recent URI, improving  GABRIEL positive 1:640 nuclear dots, unclear clinical significance  Normocytic anemia  Transaminitis 2/2 rhabdomyolysis  Severe vitamin D deficiency    Recommendations:  Will follow peripherally for extended myositis panel results  Trend CK and CMP daily while admitted  Continue IV hydration and pain control as per primary team  Recommend starting vitamin D supplementation  Agree with further genetics/metabolism work-up. Patient would benefit from outpatient genetics/metabolism follow-up on discharge (re-establish care)  Patient can be discharged from rheumatology standpoint once rhabdomyolysis improves    The rest per the primary team.     Thank you for this interesting consult. Rheumatology will follow peripherally for extended myositis panel results.     Independently reviewed three or more labs  Images including CXR, CT, MRI independently reviewed.   Monitoring for drug toxicity   Discussed with patient and primary team     Patient seen and discussed with Dr. Arriaga.    Nissa Navarro MD  Rheumatology Fellow, PGY-4

## 2025-02-13 NOTE — CARE PLAN
The patient's goals for the shift include  getting some rest    The clinical goals for the shift include pt will remain HDS during this shift

## 2025-02-14 VITALS
OXYGEN SATURATION: 99 % | HEIGHT: 63 IN | TEMPERATURE: 96.8 F | HEART RATE: 69 BPM | WEIGHT: 224 LBS | SYSTOLIC BLOOD PRESSURE: 128 MMHG | DIASTOLIC BLOOD PRESSURE: 78 MMHG | BODY MASS INDEX: 39.69 KG/M2 | RESPIRATION RATE: 16 BRPM

## 2025-02-14 LAB
3OH-DODECANOYLCARN SERPL-SCNC: <0.01 UMOL/L
3OH-ISOVALERYLCARN SERPL-SCNC: 0.03 UMOL/L
3OH-LINOLEOYLCARN SERPL-SCNC: <0.01 UMOL/L
3OH-OLEOYLCARN SERPL-SCNC: <0.01 UMOL/L
3OH-PALMITOLEYLCARN SERPL-SCNC: <0.01 UMOL/L
3OH-PALMITOYLCARN SERPL-SCNC: <0.01 UMOL/L
3OH-STEAROYLCARN SERPL-SCNC: <0.01 UMOL/L
3OH-TDECANOYLCARN SERPL-SCNC: <0.01 UMOL/L
3OH-TDECENOYLCARN SERPL-SCNC: <0.01 UMOL/L
ACETYLCARN SERPL-SCNC: 9.33 UMOL/L (ref 2.93–15.06)
ACYLCARNITINE PATTERN SERPL-IMP: NORMAL
ANION GAP SERPL CALC-SCNC: 12 MMOL/L (ref 10–20)
BUN SERPL-MCNC: 6 MG/DL (ref 6–23)
BUTYRYL+ISOBUTYRYLCARN SERPL-SCNC: 0.18 UMOL/L
CALCIUM SERPL-MCNC: 8.6 MG/DL (ref 8.6–10.6)
CARN ESTERS SERPL-SCNC: 16 UMOL/L (ref 5–29)
CARN ESTERS/C0 SERPL-SRTO: 0.5 RATIO (ref 0.1–1)
CARNITINE FREE SERPL-SCNC: 35 UMOL/L (ref 25–60)
CARNITINE SERPL-SCNC: 51 UMOL/L (ref 34–86)
CHLORIDE SERPL-SCNC: 103 MMOL/L (ref 98–107)
CK SERPL-CCNC: 8278 U/L (ref 0–215)
CO2 SERPL-SCNC: 24 MMOL/L (ref 21–32)
CREAT SERPL-MCNC: 0.36 MG/DL (ref 0.5–1.05)
DECANOYLCARN SERPL-SCNC: 0.16 UMOL/L
DECENOYLCARN SERPL-SCNC: 0.09 UMOL/L
DODECANOYLCARN SERPL-SCNC: 0.04 UMOL/L
DODECENOYLCARN SERPL-SCNC: 0.05 UMOL/L
EGFRCR SERPLBLD CKD-EPI 2021: >90 ML/MIN/1.73M*2
GLUCOSE SERPL-MCNC: 75 MG/DL (ref 74–99)
GLUTARYLCARN SERPL-SCNC: 0.07 UMOL/L
HEXANOYLCARN SERPL-SCNC: 0.08 UMOL/L
ISOVALERYL+MEBUTYRYLCARN SERPL-SCNC: 0.24 UMOL/L
LINOLEOYLCARN SERPL-SCNC: 0.04 UMOL/L
OCTANOYLCARN SERPL-SCNC: 0.11 UMOL/L
OCTENOYLCARN SERPL-SCNC: 0.16 UMOL/L
OLEOYLCARN SERPL-SCNC: 0.08 UMOL/L
PALMITOLEYLCARN SERPL-SCNC: 0.02 UMOL/L
PALMITOYLCARN SERPL-SCNC: 0.07 UMOL/L
POTASSIUM SERPL-SCNC: 4.4 MMOL/L (ref 3.5–5.3)
PROPIONYLCARN SERPL-SCNC: 0.39 UMOL/L
SODIUM SERPL-SCNC: 135 MMOL/L (ref 136–145)
STEAROYLCARN SERPL-SCNC: 0.02 UMOL/L
TDECADIENOYLCARN SERPL-SCNC: 0.03 UMOL/L
TDECANOYLCARN SERPL-SCNC: 0.02 UMOL/L
TDECENOYLCARN SERPL-SCNC: 0.05 UMOL/L

## 2025-02-14 PROCEDURE — 99239 HOSP IP/OBS DSCHRG MGMT >30: CPT | Performed by: INTERNAL MEDICINE

## 2025-02-14 PROCEDURE — 2500000004 HC RX 250 GENERAL PHARMACY W/ HCPCS (ALT 636 FOR OP/ED): Performed by: INTERNAL MEDICINE

## 2025-02-14 PROCEDURE — 2500000001 HC RX 250 WO HCPCS SELF ADMINISTERED DRUGS (ALT 637 FOR MEDICARE OP)

## 2025-02-14 PROCEDURE — 82550 ASSAY OF CK (CPK): CPT | Performed by: INTERNAL MEDICINE

## 2025-02-14 PROCEDURE — 2500000001 HC RX 250 WO HCPCS SELF ADMINISTERED DRUGS (ALT 637 FOR MEDICARE OP): Performed by: INTERNAL MEDICINE

## 2025-02-14 PROCEDURE — 82374 ASSAY BLOOD CARBON DIOXIDE: CPT | Performed by: INTERNAL MEDICINE

## 2025-02-14 PROCEDURE — 36415 COLL VENOUS BLD VENIPUNCTURE: CPT | Performed by: INTERNAL MEDICINE

## 2025-02-14 RX ORDER — METHOCARBAMOL 500 MG/1
500 TABLET, FILM COATED ORAL 3 TIMES DAILY PRN
Qty: 90 TABLET | Refills: 0 | Status: SHIPPED | OUTPATIENT
Start: 2025-02-14 | End: 2025-03-16

## 2025-02-14 RX ORDER — ERGOCALCIFEROL 1.25 MG/1
1250 CAPSULE ORAL WEEKLY
Qty: 4 CAPSULE | Refills: 2 | Status: SHIPPED | OUTPATIENT
Start: 2025-02-19 | End: 2025-03-21

## 2025-02-14 RX ADMIN — METHOCARBAMOL 500 MG: 500 TABLET ORAL at 00:00

## 2025-02-14 RX ADMIN — METHOCARBAMOL 500 MG: 500 TABLET ORAL at 05:21

## 2025-02-14 RX ADMIN — ACETAMINOPHEN 975 MG: 325 TABLET ORAL at 08:34

## 2025-02-14 RX ADMIN — METHOCARBAMOL 500 MG: 500 TABLET ORAL at 11:40

## 2025-02-14 RX ADMIN — SODIUM CHLORIDE 200 ML/HR: 9 INJECTION, SOLUTION INTRAVENOUS at 08:05

## 2025-02-14 RX ADMIN — OXYCODONE 10 MG: 5 TABLET ORAL at 08:35

## 2025-02-14 RX ADMIN — SODIUM CHLORIDE 200 ML/HR: 9 INJECTION, SOLUTION INTRAVENOUS at 00:48

## 2025-02-14 ASSESSMENT — PAIN SCALES - GENERAL
PAINLEVEL_OUTOF10: 0 - NO PAIN
PAINLEVEL_OUTOF10: 9

## 2025-02-14 ASSESSMENT — PAIN - FUNCTIONAL ASSESSMENT
PAIN_FUNCTIONAL_ASSESSMENT: 0-10
PAIN_FUNCTIONAL_ASSESSMENT: 0-10

## 2025-02-14 ASSESSMENT — PAIN DESCRIPTION - ORIENTATION: ORIENTATION: RIGHT;LEFT

## 2025-02-14 ASSESSMENT — PAIN DESCRIPTION - LOCATION: LOCATION: LEG

## 2025-02-14 NOTE — DISCHARGE INSTRUCTIONS
Please follow up with Genetics.   Please drink 2 liters fluids daily.   Check CK levels with your PCP in 2 weeks.

## 2025-02-14 NOTE — NURSING NOTE
Discharge Note:      VN went over AVS with Pt. Pt had no further questions. Pt is asking for a back to work notice from MD. Floor nurse and MD is aware. Pt is aware of follow up appts and blood work. Pt is aware of where to  prescriptions for medications. Floor nurse removed Ivs and intact. Pt has all belongings at the bedside and is awaiting transport.

## 2025-02-14 NOTE — DISCHARGE SUMMARY
"Discharge Diagnosis  Non-traumatic rhabdomyolysis    Issues Requiring Follow-Up  PCP follow up in 2 weeks. ( Reported PCP at [facility])  Genetic clinic follow up.     Discharge Meds     Medication List      START taking these medications     ergocalciferol 1.25 MG (87077 UT) capsule; Commonly known as: Vitamin   D-2; Take 1 capsule (1,250 mcg) by mouth 1 (one) time per week.; Start   taking on: February 19, 2025   methocarbamol 500 mg tablet; Commonly known as: Robaxin; Take 1 tablet   (500 mg) by mouth 3 times a day as needed for muscle spasms.     CONTINUE taking these medications     ibuprofen 600 mg tablet; TAKE 1 TABLET BY MOUTH EVERY 8 HOURS AS NEEDED   FOR PAIN   valACYclovir 1 gram tablet; Commonly known as: Valtrex; TAKE 1 TABLET BY   MOUTH EVERY DAY       Test Results Pending At Discharge  Pending Labs       Order Current Status    DNA Extraction/Storage In process    Extended Myositis Panel In process    Organic acids, urine In process            Hospital Course    Ms. Au is a 34 year old F with PMH of recurrent rhabdomyolysis ( last admission reported as Jan 2020) who was presented with generalized muscle pains for about a week with CK >100K c/w rhabdomyolysis. Pt notes that she has had viral URI sx week earlier, with cough, pleuritic CP and rhinorrhea. COVID/flu/RSV tests were negative. Per chart review, patient had an extensive metabolic workup in the past as well as muscle bx that was unrevealing. 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   She was treated with iv fluids with slow improvement in CK.   Advised to drink 2 liters fluids a day.  Advised muscle relaxant Robaxin 500 mg TID , PRN and follow up with PCP in 1 to 2 weeks to recheck CK levels.     Issues addressed:      Rhabdomyolysis in the setting of recent viral URI  Elevated transaminases  - TSH 0.10, free T4 0.93 "   - s/p 2L LR in the ED. C/w NS at 200cc/hr   UDS positive for Marijuana,   - hepatitis panel negative,   Genetics, recommended metabolism labs and follow-up as an outpatient (their office will call the patient)  Rheumatology is consulted, recs appreciated.   Rheumatology recs appreciated,   She was treated with NS at 200 ml/hr, with slow improvement.   She reported improvement with muscle relaxant.   CK trended down after starting muscle relaxant.   CK trended down to 8 K on Feb 14th.   Advised Robaxin 500 mg Q 8hrs PRN, x 30 days.   Follow up with PCP in 1 to 2 weeks to check CK levels.   Follow up with Genetics.     Discharged home in a stable condition. Advised 2 liters fluid intake daily.   Advised to attend ED if passing dark urine, or blood colored urine.   Discharge day management time > 30 minutes.             Pertinent Physical Exam At Time of Discharge:  Vitals:    02/14/25 0749   BP: 128/78   Pulse: 69   Resp: 16   Temp: 36 °C (96.8 °F)   SpO2: 99%       Physical Exam  Constitutional:       Appearance: Normal appearance.   HENT:      Head: Normocephalic.      Nose: Nose normal.   Eyes:      Extraocular Movements: Extraocular movements intact.      Pupils: Pupils are equal, round, and reactive to light.   Cardiovascular:      Rate and Rhythm: Normal rate and regular rhythm.      Heart sounds: Normal heart sounds. No murmur heard.  Pulmonary:      Effort: No respiratory distress.      Breath sounds: Normal breath sounds. No wheezing.   Abdominal:      General: There is no distension.      Palpations: Abdomen is soft.      Tenderness: There is no abdominal tenderness.   Musculoskeletal:         General: Normal range of motion.      Cervical back: Normal range of motion.   Skin:     General: Skin is warm.   Neurological:      General: No focal deficit present.      Mental Status: She is alert and oriented to person, place, and time.   Psychiatric:         Mood and Affect: Mood normal.         Outpatient  Follow-Up  Future Appointments   Date Time Provider Department Center   4/8/2025 10:00 AM Ranjit Sanchez MD [id number]         Ren Banuelos MD

## 2025-02-18 LAB
ANA PAT SER IF-IMP: ABNORMAL
ANA PAT SER IF-IMP: ABNORMAL
ANA SER QL: NEGATIVE
ANNOTATION COMMENT IMP: ABNORMAL
CYTOPLASMIC AB PATTERN SER IF-IMP: ABNORMAL
CYTOPLASMIC AB TITR SER IF: ABNORMAL {TITER}
EJ AB SER QL: NEGATIVE
ENA JO1 IGG SER-ACNC: 2 AU/ML (ref 0–40)
ENA SS-A 60KD AB SER-ACNC: 1 AU/ML (ref 0–40)
ENA SS-A IGG SER QL: 26 AU/ML (ref 0–40)
FIBRILLARIN AB SER QL: NEGATIVE
HA (TYROSYL-TRNA SYNTHETASE) AB: NEGATIVE
KS (ASPARAGINYL-TRNA SYNTHETASE) AB: NEGATIVE
KU AB SER QL: NEGATIVE
MDA5 AB SER QL LINE BLOT: NEGATIVE
MI2 AB SER QL: NEGATIVE
MJ AB SER QL LINE BLOT: NEGATIVE
NUCLEAR IGG SER QL IF: DETECTED
NUCLEAR IGG TITR SER IF: ABNORMAL {TITER}
OJ AB SER QL: NEGATIVE
PL12 AB SER QL: NEGATIVE
PL7 AB SER QL: NEGATIVE
PM/SCL-100 AB SER QL LINE BLOT: NEGATIVE
SAE1 AB SER QL LINE BLOT: NEGATIVE
SRP AB SERPL QL: NEGATIVE
TIF1-GAMMA AB SER QL LINE BLOT: NEGATIVE
U1 SNRNP IGG SER-ACNC: 3 UNITS (ref 0–19)
ZO (PHENYLALANYL-TRNA SYNTHETASE) AB: NEGATIVE

## 2025-02-19 LAB
2OXO3ME-VALERATE/CREAT UR-SRTO: 1 (ref 0–10)
2OXOISOCAPROATE/CREAT UR-SRTO: NOT DETECTED (ref 0–4)
2OXOISOVALERATE/CREAT UR-SRTO: NOT DETECTED (ref 0–4)
4OH-PHENYLACETATE/CREAT UR-SRTO: 12 (ref 0–25)
4OH-PHENYLLACTATE/CREAT UR-SRTO: 1 (ref 0–4)
4OH-PHENYLPYRUVATE/CREAT UR-SRTO: NOT DETECTED (ref 0–2)
A-KETOGLUT/CREAT UR-SRTO: 24 (ref 0–75)
ACETOACET/CREAT UR-SRTO: 9 (ref 0–4)
ADIPATE/CREAT UR-SRTO: 2 (ref 0–35)
B-OH-BUTYR/CREAT UR-SRTO: 9 (ref 0–4)
CREAT UR-MCNC: 49 MG/DL
ETHYLMALONATE/CREAT UR-SRTO: 2 (ref 0–4)
FUMARATE/CREAT UR-SRTO: 1 (ref 0–4)
LACTATE/CREAT UR-SRTO: 31 (ref 0–50)
METHYLMALONATE/CREAT UR-SRTO: 1 (ref 0–5)
ORGANIC ACIDS PATTERN UR-IMP: ABNORMAL
PYRUVATE/CREAT UR-SRTO: 11 (ref 0–15)
SEBACATE/CREAT UR-SRTO: NOT DETECTED (ref 0–3)
SUBERATE/CREAT UR-SRTO: 1 (ref 0–3)
SUCCINATE/CREAT UR-SRTO: 4 (ref 0–20)
SUCCINYLACETONE/CREAT UR-SRTO: NOT DETECTED (ref 0–0)

## 2025-02-28 ENCOUNTER — OFFICE VISIT (OUTPATIENT)
Facility: HOSPITAL | Age: 35
End: 2025-02-28
Payer: COMMERCIAL

## 2025-02-28 VITALS
WEIGHT: 249 LBS | OXYGEN SATURATION: 97 % | DIASTOLIC BLOOD PRESSURE: 79 MMHG | HEART RATE: 76 BPM | SYSTOLIC BLOOD PRESSURE: 123 MMHG | HEIGHT: 63 IN | BODY MASS INDEX: 44.12 KG/M2 | TEMPERATURE: 97.3 F

## 2025-02-28 DIAGNOSIS — E55.9 VITAMIN D DEFICIENCY: ICD-10-CM

## 2025-02-28 DIAGNOSIS — M62.82 NON-TRAUMATIC RHABDOMYOLYSIS: ICD-10-CM

## 2025-02-28 DIAGNOSIS — A60.00 GENITAL HERPES SIMPLEX, UNSPECIFIED SITE: ICD-10-CM

## 2025-02-28 DIAGNOSIS — M54.9 CHRONIC BACK PAIN, UNSPECIFIED BACK LOCATION, UNSPECIFIED BACK PAIN LATERALITY: ICD-10-CM

## 2025-02-28 DIAGNOSIS — E87.1 HYPONATREMIA: ICD-10-CM

## 2025-02-28 DIAGNOSIS — G89.29 CHRONIC BACK PAIN, UNSPECIFIED BACK LOCATION, UNSPECIFIED BACK PAIN LATERALITY: ICD-10-CM

## 2025-02-28 DIAGNOSIS — Z00.00 ROUTINE GENERAL MEDICAL EXAMINATION AT A HEALTH CARE FACILITY: Primary | ICD-10-CM

## 2025-02-28 PROBLEM — F32.A DEPRESSIVE DISORDER: Status: ACTIVE | Noted: 2025-02-28

## 2025-02-28 PROBLEM — F43.20 ADJUSTMENT DISORDER, UNSPECIFIED: Status: ACTIVE | Noted: 2025-02-28

## 2025-02-28 PROCEDURE — 99203 OFFICE O/P NEW LOW 30 MIN: CPT

## 2025-02-28 PROCEDURE — 3008F BODY MASS INDEX DOCD: CPT

## 2025-02-28 PROCEDURE — 99213 OFFICE O/P EST LOW 20 MIN: CPT | Mod: GE

## 2025-02-28 ASSESSMENT — PATIENT HEALTH QUESTIONNAIRE - PHQ9
SUM OF ALL RESPONSES TO PHQ9 QUESTIONS 1 AND 2: 0
1. LITTLE INTEREST OR PLEASURE IN DOING THINGS: NOT AT ALL
2. FEELING DOWN, DEPRESSED OR HOPELESS: NOT AT ALL

## 2025-02-28 ASSESSMENT — ENCOUNTER SYMPTOMS
LOSS OF SENSATION IN FEET: 0
DEPRESSION: 0
OCCASIONAL FEELINGS OF UNSTEADINESS: 0

## 2025-02-28 ASSESSMENT — PAIN SCALES - GENERAL: PAINLEVEL_OUTOF10: 5

## 2025-02-28 NOTE — LETTER
February 28, 2025     Patient: Niya Au   YOB: 1990   Date of Visit: 2/28/2025       To Whom It May Concern:    Niya Au was seen by me on 2/28/2025. Please excuse her from work to make the appointment. It is my medical opinion that Niya Au may return to full duty immediately with no restrictions.    If you have any questions or concerns, please don't hesitate to call.         Sincerely,    Lisa Mendoza MD  Family Medicine Resident.

## 2025-02-28 NOTE — PROGRESS NOTES
"34 year old female here to establish care with a new PCP. patient doing well with no acute complaints at this time    Previous PCP: Clarita Montano    OBSTETRIC HISTORY:  G/P: 5/2  Abortions: 3  Vaginal Delivery: 2    GYNECOLOGICAL HISTORY:  LMP: during hospitalization 2/11  Intermenstrual bleeding: denied  Pelvic pain: denied  Breast/Ovarian/Uterine CA: no personal or family hx  Last PAP: Oct 2024  History of Abnormal PAP: denied    PAST MEDICAL HISTORY:  - recurrent rhabdomyolysis, unknown cause   - chronic back pain, taking robaxin 500 mg TID PRN and ibuprofen 600 mg Q8H PRN  - vitamin D deficiency  - genital herpes, on daily valtrex     PAST SURGICAL HISTORY:  - tonsillectomy 2014  - breast reduction 2012 due to back pain  - muscle biopsy 2009    FAMILY HISTORY:  - maternal grandmother: stroke, hx of brain surgery     SOCIAL HISTORY:  Tobacco: 1 black and mild daily  ETOH: 1-2 drinks once a week  Drug: smokes marijuana once a week  Sexual hx: genital herpes; active with men only  Occupation: protective  at [employer]  Housing: lives with two children in rented townhouse; no issues with housing or food security     ALLERGIES:  - penicillins: \"I only say that because that's when everything started happening\"    RECENT HOSPITALIZATIONS:  - recurrent rhabdomyolysis from 2/9-2/14    PAST PREVENTATIVE CARE:  - utd on childhood vaccines and pap screening  - \"I don't get vaccines\"    REVIEW OF SYSTEMS:   No fevers, chills, weight is stable  No sores, ulcers, rashes, skin lesions  No HA, SZ, syncope, stroke, TIA  No CP, chest pressure  No cough, SOB  no ABD pain  No BRBPR, melena, hematuria  No bleeding  no edema, no calf pain    10 point review of systems negative except HPI    PHYSICAL EXAMINATION:   Gen: NAD, non-toxic  HEENT: WNL  Chest: CTABL  CVS: regular without murmur  Abd: soft, NT/ND,   Ext: no edema, nontender  Skin: Dry, warm, good condition without wounds or lesions or rashes  Neuro: " grossly normal, CN intact, MARK x 4  Mood and affect appropriate    ASSESSMENT:    PLAN:

## 2025-03-05 NOTE — PROGRESS NOTES
I reviewed the resident/fellow's documentation and discussed the patient with the resident/fellow. I agree with the resident/fellow's medical decision making as documented in the note.    Yehuda Adams MD

## 2025-03-12 LAB
DNA VOLUME: 40 ΜL
DNA YIELD: 9.5 ΜG
ELECTRONICALLY SIGNED BY CYTOGENETICS: NORMAL

## 2025-04-04 ENCOUNTER — APPOINTMENT (OUTPATIENT)
Facility: HOSPITAL | Age: 35
End: 2025-04-04
Payer: COMMERCIAL

## 2025-04-08 ENCOUNTER — APPOINTMENT (OUTPATIENT)
Dept: GENETICS | Facility: CLINIC | Age: 35
End: 2025-04-08
Payer: COMMERCIAL

## 2025-07-24 DIAGNOSIS — M54.6 CHRONIC MIDLINE THORACIC BACK PAIN: ICD-10-CM

## 2025-07-24 DIAGNOSIS — G89.29 CHRONIC MIDLINE THORACIC BACK PAIN: ICD-10-CM

## 2025-07-26 RX ORDER — IBUPROFEN 600 MG/1
600 TABLET, FILM COATED ORAL EVERY 8 HOURS PRN
Qty: 90 TABLET | Refills: 1 | Status: SHIPPED | OUTPATIENT
Start: 2025-07-26

## 2025-07-28 DIAGNOSIS — B00.9 HSV (HERPES SIMPLEX VIRUS) INFECTION: ICD-10-CM

## 2025-07-28 DIAGNOSIS — Z86.19 HISTORY OF HERPES GENITALIS: ICD-10-CM

## 2025-07-28 RX ORDER — VALACYCLOVIR HYDROCHLORIDE 1 G/1
1000 TABLET, FILM COATED ORAL DAILY
Qty: 90 TABLET | Refills: 3 | Status: SHIPPED | OUTPATIENT
Start: 2025-07-28